# Patient Record
Sex: FEMALE | Race: WHITE | NOT HISPANIC OR LATINO | Employment: FULL TIME | URBAN - METROPOLITAN AREA
[De-identification: names, ages, dates, MRNs, and addresses within clinical notes are randomized per-mention and may not be internally consistent; named-entity substitution may affect disease eponyms.]

---

## 2019-10-17 ENCOUNTER — TRANSFERRED RECORDS (OUTPATIENT)
Dept: HEALTH INFORMATION MANAGEMENT | Facility: CLINIC | Age: 54
End: 2019-10-17

## 2019-11-04 ENCOUNTER — TRANSFERRED RECORDS (OUTPATIENT)
Dept: HEALTH INFORMATION MANAGEMENT | Facility: CLINIC | Age: 54
End: 2019-11-04

## 2020-03-06 ENCOUNTER — TRANSFERRED RECORDS (OUTPATIENT)
Dept: HEALTH INFORMATION MANAGEMENT | Facility: CLINIC | Age: 55
End: 2020-03-06

## 2020-03-12 ENCOUNTER — MEDICAL CORRESPONDENCE (OUTPATIENT)
Dept: HEALTH INFORMATION MANAGEMENT | Facility: CLINIC | Age: 55
End: 2020-03-12

## 2020-03-16 ENCOUNTER — TRANSCRIBE ORDERS (OUTPATIENT)
Dept: OTHER | Age: 55
End: 2020-03-16

## 2020-03-16 DIAGNOSIS — E05.00 GRAVES' ORBITOPATHY: Primary | ICD-10-CM

## 2020-03-19 ENCOUNTER — TELEPHONE (OUTPATIENT)
Dept: OPHTHALMOLOGY | Facility: CLINIC | Age: 55
End: 2020-03-19

## 2020-04-02 ENCOUNTER — TELEPHONE (OUTPATIENT)
Dept: OPHTHALMOLOGY | Facility: CLINIC | Age: 55
End: 2020-04-02

## 2020-04-02 NOTE — LETTER
2020         Lucrecia Thornton  [address] [facility] [address]      Patient: Lucrecia Thornton  : 1965   MRN: [id number]       Dear Ms. Thornton:    We attempted to reach you by telephone regarding your appointment on 2020 with Dr. Bhat.  Unfortunately, your appointment has been canceled at this time as we have been asked to cancel all clinics due to concern for COVID-19.  Please contact our appointment line at [phone] to reschedule in approximately two to three months.        Please feel free to contact our office with any questions or concerns.      Sincerely,         Srinivasan Bhat MD   Department of Ophthalmology   [facility]

## 2020-04-02 NOTE — TELEPHONE ENCOUNTER
Left message for patient.  Upcoming appointment with Dr. Bhat has been canceled due to concerns of COVID-19 and the safety of our patients.  Patient does have the option to virtual visit or can reschedule in clinic appointment to AT LEAST TWO TO THREE MONTHS OUT.  Gave her my direct number in order to reschedule.      Jaye Hernandes on 4/2/2020 at 3:33 PM

## 2020-04-14 ENCOUNTER — VIRTUAL VISIT (OUTPATIENT)
Dept: OPHTHALMOLOGY | Facility: CLINIC | Age: 55
End: 2020-04-14
Attending: OPHTHALMOLOGY
Payer: COMMERCIAL

## 2020-04-14 DIAGNOSIS — E07.9 THYROID EYE DISEASE: Primary | ICD-10-CM

## 2020-04-14 DIAGNOSIS — E05.00 GRAVES DISEASE: ICD-10-CM

## 2020-04-14 DIAGNOSIS — H57.89 THYROID EYE DISEASE: Primary | ICD-10-CM

## 2020-04-14 RX ORDER — LEVOTHYROXINE SODIUM 125 UG/1
50 TABLET ORAL DAILY
COMMUNITY
Start: 2020-03-08

## 2020-04-14 NOTE — PROGRESS NOTES
"Lucrecia Thornton is a 54 year old female who is being evaluated via a billable video visit.      The patient has been notified of following:     \"This video visit will be conducted via a call between you and your physician/provider. We have found that certain health care needs can be provided without the need for an in-person physical exam.  This service lets us provide the care you need with a video conversation.  If a prescription is necessary we can send it directly to your pharmacy.  If lab work is needed we can place an order for that and you can then stop by our lab to have the test done at a later time.    Video visits are billed at different rates depending on your insurance coverage.  Please reach out to your insurance provider with any questions.    If during the course of the call the physician/provider feels a video visit is not appropriate, you will not be charged for this service.\"    Patient has given verbal consent for Video visit? Yes    How would you like to obtain your AVS? E-Mail (inform patient AVS not encrypted)    Patient would like the video invitation sent by: Text to cell phone: [phone]      Video Start Time: 8:45 AM    Additional provider notes: insert own note template here        Assessment & Plan     HPI: Patient is a 54 year old female diagnosed with Graves disease in March 2019. She notes that on August 4th, 2019 she first started having a yellow discharge and noted that her eyes felt \"tight\" and \"gummy.\" She was then seen by Dr. Goerge and started Lasix for renetta-ocular edema. She developed a foreign body sensation, and photophobia with puffy eyelids in September 2019 and this progressed until October 2019. She saw Dr. Bustos and was noted to have increased intraocular pressure at [facility] and was started on the prednisolone and took it for 2 months. She started oral prednisone in early November starting on 40 mg and was on that dose for one month. In December she " continued to have difficulty looking up and took 20 mg prednisone daily for 3 weeks and then stopped. Overall she feels that her renetta-ocular edema and pain have improved since being on prednisone.     Over the past month her daughter has noted proptosis of both eyes at night. She notes that she has been having binocular diplopia for several months that has been getting better. Diplopia noticeable in primary gaze and right gaze. She has had improvement in her renetta-ocular edema and noticed that she is having more drooping skin. Continues to have watering when she exercises, reads, or is outdoors. She uses artificial tears multiple times a day that improves foreign body sensation momentarily. Denies eyelid retraction    Non-smoker. LASIK surgery both eyes 1998    Referring physician: Dr. George    Thyroid history:  Diagnosed when? March 2019  ORDOÑEZ: June 2019  Thyroidectomy: NA    TSI (date): 1.12 3/2020     Previous results: 17.1 10/2019, 4.74 12/2019    Eye symptoms (since when):   Proptosis (better/worse/same since last visit): Initial   Diplopia(better/worse/same since last visit): Initial  Eyelid retraction(better/worse/same since last visit): Initial  Tearing(better/worse/same since last visit): Initial  Redness (better/worse/same since last visit): Initial  Pain ((better/worse/same since last visit): Initial  Pain to move the eyes (better/worse/same since last visit): Initial  Blurred vision: Yes, initial    Ocular history:   Orbital decompression (date, details): NA  Strabismus surgery (date, details): NA  Eyelid surgery (date, details): NA    Exam:   Adam (base):NA     Better/worse same: NA  Strabismus (better/worse/same): NA  Eyelid retraction (better/worse/same): NA    Lucrecia Thornton is a 54 year old female with the following diagnoses:   1. Thyroid eye disease    2. Graves disease       It is my impression that Lucrecia has thyroid eye disease at this time that is improving based upon symptoms and TSI. She  was worse before starting prednisone and is now much better suggesting that she is on trending towards eventual stabilization. We discussed starting IV steroids at this time and the lesser incidence of side effects compared to oral steroids. She had palpitations with HR in the 200's on 40 mg oral prednisone requiring metoprolol. Likely intravenous steroids would be too dangerous. She would like treatment.  Will start prior authorization for Tepezza.  [facility]-6 [facility] sooner as needed for worsening symptoms.          Attending Physician Attestation:  Complete documentation of historical and exam elements from today's encounter can be found in the full encounter summary report (not reduplicated in this progress note).  I personally obtained the chief complaint(s) and history of present illness.  I confirmed and edited as necessary the review of systems, past medical/surgical history, family history, social history, and examination findings as documented by others; and I examined the patient myself.  I personally reviewed the relevant tests, images, and reports as documented above.  I formulated and edited as necessary the assessment and plan and discussed the findings and management plan with the patient and family. - Srinivasan John MD  Ophthalmology, PGY-5  Neuro-Ophthalmology Fellow        Video-Visit Details    Type of service:  Video Visit    Video End Time (time video stopped): 9:30    Originating Location (pt. Location): Home    Distant Location (provider location):  EYE CLINIC     Mode of Communication:  Video Conference via [employer]

## 2020-04-14 NOTE — LETTER
"2020         RE:  :  MRN: Lucrecia Thornton  1965  [id number]     Dear Dr. George:     Thank you for asking me to see your very pleasant patient, Lucrecia Thornton, in neuro-ophthalmic consultation.  I would like to thank you for sending your records and I have summarized them in the history of present illness.My assessment and plan are below.  For further details, please see my attached clinic note.      Assessment & Plan   HPI: Patient is a 54 year old female diagnosed with Graves disease in 2019. She notes that on 2019 she first started having a yellow discharge and noted that her eyes felt \"tight\" and \"gummy.\" She was then seen by Dr. George and started Lasix for renetta-ocular edema. She developed a foreign body sensation, and photophobia with puffy eyelids in 2019 and this progressed until 2019. She saw Dr. Bustos and was noted to have increased intraocular pressure at [facility] and was started on the prednisolone and took it for 2 months. She started oral prednisone in early November starting on 40 mg and was on that dose for one month. In December she continued to have difficulty looking up and took 20 mg prednisone daily for 3 weeks and then stopped. Overall she feels that her renetta-ocular edema and pain have improved since being on prednisone.     Over the past month her daughter has noted proptosis of both eyes at night. She notes that she has been having binocular diplopia for several months that has been getting better. Diplopia noticeable in primary gaze and right gaze. She has had improvement in her renetta-ocular edema and noticed that she is having more drooping skin. Continues to have watering when she exercises, reads, or is outdoors. She uses artificial tears multiple times a day that improves foreign body sensation momentarily. Denies eyelid retraction    Non-smoker. LASIK surgery both eyes     Referring physician: Dr. George    Thyroid " history:  Diagnosed when? March 2019  ORDOÑEZ: June 2019  Thyroidectomy: NA    TSI (date): 1.12 3/2020     Previous results: 17.1 10/2019, 4.74 12/2019    Eye symptoms (since when):   Proptosis (better/worse/same since last visit): Initial   Diplopia(better/worse/same since last visit): Initial  Eyelid retraction(better/worse/same since last visit): Initial  Tearing(better/worse/same since last visit): Initial  Redness (better/worse/same since last visit): Initial  Pain ((better/worse/same since last visit): Initial  Pain to move the eyes (better/worse/same since last visit): Initial  Blurred vision: Yes, initial    Ocular history:   Orbital decompression (date, details): NA  Strabismus surgery (date, details): NA  Eyelid surgery (date, details): NA    Exam:   Adam (base):NA     Better/worse same: NA  Strabismus (better/worse/same): NA  Eyelid retraction (better/worse/same): NA    Lucrecia Thornton is a 54 year old female with the following diagnoses:   1. Thyroid eye disease    2. Graves disease       It is my impression that Lucrecia has thyroid eye disease at this time that is improving based upon symptoms and TSI. She was worse before starting prednisone and is now much better suggesting that she is on trending towards eventual stabilization. We discussed starting IV steroids at this time and the lesser incidence of side effects compared to oral steroids. She had palpitations with HR in the 200's on 40 mg oral prednisone requiring metoprolol. Likely intravenous steroids would be too dangerous. She would like treatment.  Will start prior authorization for Tepezza.  recommend [facility] up in [facility] 4-6 months sooner as needed for worsening symptoms.             Again, thank you for allowing me to participate in the care of your patient.      Sincerely,    Srinivasan Bhat MD  Professor  Ophthalmology Residency   Director of Neuro-Ophthalmology  [facility]  Chair  Departments of Ophthalmology, Neurology, and Neurosurgery  [facility] [address] [place]  [address]  T - [phone]  F - [phone]  ROSINA [email or web address]      CC: Girma George MD  [facility]  [address] [address]  VIA Facsimile: [phone]    DX = Thyroid eye disease, tepezza

## 2020-04-14 NOTE — NURSING NOTE
Chief Complaints and History of Present Illnesses   Patient presents with     Thyroid Disease     took radioactive iodine last june. had a flare-up in august. got worse. seen by Dr. George in October and was diagnosed with Graves. Tapering from 40 mg prednisone over a period of 6-7 weeks (started december 14, 2019). discontinued prednisolone drops as well.      Chief Complaint(s) and History of Present Illness(es)     Thyroid Disease     Comments: took radioactive iodine last june. had a flare-up in august. got worse. seen by Dr. George in October and was diagnosed with Graves. Tapering from 40 mg prednisone over a period of 6-7 weeks (started december 14, 2019). discontinued prednisolone drops as well.               Comments     Complaining of intermittent diplopia. In the evenings oblique. Unsure if it improves with monocular closure. Worse after a long day when eyes are tired and can feel the swelling. Patient still has redness and edema.

## 2020-04-20 ENCOUNTER — TELEPHONE (OUTPATIENT)
Dept: OPHTHALMOLOGY | Facility: CLINIC | Age: 55
End: 2020-04-20

## 2020-04-20 NOTE — TELEPHONE ENCOUNTER
Left message for patient.  Insurance denied authorization asking for if patient had decrease in [place] and what their clinical activity score is.  Dr. Bhat would like to see patient in clinic in order to obtain.  Gave patient my direct number in order to schedule.     Jaye Hernandes on 4/20/2020 at 11:46 AM

## 2020-04-29 ENCOUNTER — OFFICE VISIT (OUTPATIENT)
Dept: OPHTHALMOLOGY | Facility: CLINIC | Age: 55
End: 2020-04-29

## 2020-04-29 DIAGNOSIS — H57.89 THYROID EYE DISEASE: ICD-10-CM

## 2020-04-29 DIAGNOSIS — E07.9 THYROID EYE DISEASE: ICD-10-CM

## 2020-04-29 DIAGNOSIS — H53.10 SUBJECTIVE VISUAL DISTURBANCE: ICD-10-CM

## 2020-04-29 DIAGNOSIS — H57.89 THYROID EYE DISEASE: Primary | ICD-10-CM

## 2020-04-29 DIAGNOSIS — E07.9 THYROID EYE DISEASE: Primary | ICD-10-CM

## 2020-04-29 RX ORDER — HEPARIN SODIUM,PORCINE 10 UNIT/ML
5 VIAL (ML) INTRAVENOUS
Status: CANCELLED | OUTPATIENT
Start: 2020-05-06

## 2020-04-29 RX ORDER — ACETAMINOPHEN 325 MG/1
650 TABLET ORAL
Status: CANCELLED
Start: 2020-05-06

## 2020-04-29 RX ORDER — METHYLPREDNISOLONE SODIUM SUCCINATE 125 MG/2ML
125 INJECTION, POWDER, LYOPHILIZED, FOR SOLUTION INTRAMUSCULAR; INTRAVENOUS
Status: CANCELLED
Start: 2020-05-06

## 2020-04-29 RX ORDER — HEPARIN SODIUM (PORCINE) LOCK FLUSH IV SOLN 100 UNIT/ML 100 UNIT/ML
5 SOLUTION INTRAVENOUS
Status: CANCELLED | OUTPATIENT
Start: 2020-05-06

## 2020-04-29 RX ORDER — DIPHENHYDRAMINE HCL 25 MG
50 CAPSULE ORAL
Status: CANCELLED
Start: 2020-05-06

## 2020-04-29 ASSESSMENT — VISUAL ACUITY
METHOD: SNELLEN - LINEAR
OS_SC: 20/20
OD_SC: 20/20

## 2020-04-29 ASSESSMENT — EXTERNAL EXAM - LEFT EYE: OS_EXAM: EYELID EDEMA

## 2020-04-29 ASSESSMENT — TONOMETRY
OS_IOP_MMHG: 15
IOP_METHOD: ICARE
OD_IOP_MMHG: 15

## 2020-04-29 ASSESSMENT — CONF VISUAL FIELD
METHOD: COUNTING FINGERS
OS_NORMAL: 1
OD_NORMAL: 1

## 2020-04-29 ASSESSMENT — EXTERNAL EXAM - RIGHT EYE: OD_EXAM: EYELID EDEMA

## 2020-04-29 ASSESSMENT — SLIT LAMP EXAM - LIDS
COMMENTS: NORMAL
COMMENTS: NORMAL

## 2020-04-29 NOTE — NURSING NOTE
Chief Complaints and History of Present Illnesses   Patient presents with     Consult For     Thyroid eye Disease     Chief Complaint(s) and History of Present Illness(es)     Consult For     Laterality: both eyes    Onset: 6 months ago    Associated symptoms: burning, double vision and dryness.  Negative for eye pain    Pain scale: 0/10    Comments: Thyroid eye Disease              Comments     Patient states she has had diplopia since October, stable, comes and goes, mostly present in the evenings, goes away when closing an eye, feels it is vertical diplopia.  Has bulging sensation after working out, along with throbbing in eyes and HA, dryness and burning occ.     Krystal Wills COT April 29, 2020 9:02 AM                   wears glasses at home/Partially impaired: cannot see medication labels or newsprint, but can see obstacles in path, and the surrounding layout; can count fingers at arm's length

## 2020-04-29 NOTE — PROGRESS NOTES
Assessment & Plan     Lucrecia Thornton is a 54 year old female with the following diagnoses:   1. Thyroid eye disease       1. Spontaneous orbital pain.     YES   2. Gaze evoked orbital pain.     YES  3. Eyelid swelling due to active thyroid eye disease  YES  4. Eyelid erythema.       NO  5. Conjunctival redness due to active thyroid eye disease . YES  6. Chemosis.        YES   7. Inflammation of caruncle OR plica.    NO    Patients assessed after follow-up can be scored out of 10 by  including items 8-10.    8. Increase of > 2mm in proptosis.    YES / NO [619193]   9. Decrease in uniocular excursion in any direction of > 8 . YES / NO [233220]  10. Decrease of acuity equivalent to 1 Snellen line.  YES / NO [270526]     SHANI SCORE = 5    She has Thyroid eye disease with a SHANI score of 5.  Will proceed with tepezza prior authorization.           Attending Physician Attestation:  Complete documentation of historical and exam elements from today's encounter can be found in the full encounter summary report (not reduplicated in this progress note).  I personally obtained the chief complaint(s) and history of present illness.  I confirmed and edited as necessary the review of systems, past medical/surgical history, family history, social history, and examination findings as documented by others; and I examined the patient myself.  I personally reviewed the relevant tests, images, and reports as documented above.  I formulated and edited as necessary the assessment and plan and discussed the findings and management plan with the patient and family. - Srinivasan Bhat MD;

## 2020-05-08 ENCOUNTER — TELEPHONE (OUTPATIENT)
Dept: OPHTHALMOLOGY | Facility: CLINIC | Age: 55
End: 2020-05-08

## 2020-05-08 NOTE — TELEPHONE ENCOUNTER
Spoke to patient.  Received approval from her insurance for her Tepezza infusions.  Provided scheduling number.  Mailed last chart notes and assisted with getting set up for MyChart.      Jaye Hernandes on 5/8/2020 at 8:55 AM

## 2020-05-15 ENCOUNTER — INFUSION THERAPY VISIT (OUTPATIENT)
Dept: INFUSION THERAPY | Facility: CLINIC | Age: 55
End: 2020-05-15
Attending: OPHTHALMOLOGY
Payer: COMMERCIAL

## 2020-05-15 VITALS — WEIGHT: 131.9 LBS | TEMPERATURE: 98.2 F

## 2020-05-15 DIAGNOSIS — H57.89 THYROID EYE DISEASE: Primary | ICD-10-CM

## 2020-05-15 DIAGNOSIS — E07.9 THYROID EYE DISEASE: Primary | ICD-10-CM

## 2020-05-15 PROCEDURE — 96366 THER/PROPH/DIAG IV INF ADDON: CPT

## 2020-05-15 PROCEDURE — 96365 THER/PROPH/DIAG IV INF INIT: CPT

## 2020-05-15 PROCEDURE — C9399 UNCLASSIFIED DRUGS OR BIOLOG: HCPCS | Mod: ZF | Performed by: OPHTHALMOLOGY

## 2020-05-15 PROCEDURE — 25000128 H RX IP 250 OP 636: Mod: ZF | Performed by: OPHTHALMOLOGY

## 2020-05-15 PROCEDURE — 25800030 ZZH RX IP 258 OP 636: Mod: ZF | Performed by: OPHTHALMOLOGY

## 2020-05-15 PROCEDURE — 96413 CHEMO IV INFUSION 1 HR: CPT

## 2020-05-15 PROCEDURE — 96415 CHEMO IV INFUSION ADDL HR: CPT

## 2020-05-15 RX ORDER — ACETAMINOPHEN 325 MG/1
650 TABLET ORAL
Status: CANCELLED
Start: 2020-06-05

## 2020-05-15 RX ORDER — DIPHENHYDRAMINE HCL 25 MG
50 CAPSULE ORAL
Status: CANCELLED
Start: 2020-06-05

## 2020-05-15 RX ORDER — HEPARIN SODIUM,PORCINE 10 UNIT/ML
5 VIAL (ML) INTRAVENOUS
Status: CANCELLED | OUTPATIENT
Start: 2020-06-05

## 2020-05-15 RX ORDER — HEPARIN SODIUM (PORCINE) LOCK FLUSH IV SOLN 100 UNIT/ML 100 UNIT/ML
5 SOLUTION INTRAVENOUS
Status: CANCELLED | OUTPATIENT
Start: 2020-06-05

## 2020-05-15 RX ORDER — METHYLPREDNISOLONE SODIUM SUCCINATE 125 MG/2ML
125 INJECTION, POWDER, LYOPHILIZED, FOR SOLUTION INTRAMUSCULAR; INTRAVENOUS
Status: CANCELLED
Start: 2020-06-05

## 2020-05-15 RX ADMIN — TEPROTUMUMAB 600 MG: 500 INJECTION, POWDER, LYOPHILIZED, FOR SOLUTION INTRAVENOUS at 13:21

## 2020-05-15 NOTE — PATIENT INSTRUCTIONS
Dear Lucrecia Thornton    Thank you for choosing [facility] ([facility]) for your infusion.  The following information is a summary of our appointment as well as important reminders.      Your infusion today of teprotumumab-trbw (TEPEZZA) 600 mg in sodium chloride 0.9 % 100 mL infusion       We look forward in seeing you on your next appointment here at [facility] ([facility]).  Please don t hesitate to call us at [phone] to reschedule any of your appointments or to speak with one of the [facility] registered nurses.  It was a pleasure taking care of you today.    Sincerely,    [facility]  [address] [place] [address]  [address]  Phone:  [phone]    Tepezza (teprotumumab-trbw) is an insulin-like growth factor-1 receptor inhibitor used to treat Thyroid Eye disease.    What Is Tepezza?  Tepezza (teprotumumab-trbw) is an insulin-like growth factor-1 receptor inhibitor used to treat Thyroid Eye disease.    What Are Side Effects of Tepezza?  Side effects of Tepezza include:    muscle spasm,  nausea,  hair loss,  diarrhea,  fatigue,  high blood sugar (hyperglycemia),  hearing impairment,  dry skin,  changes in taste, and  headache  Dosage for Tepezza  The initial dose of Tepezza is 10 mg/kg for the first infusion, followed by 20 mg/kg every 3 weeks for 7 additional infusions. Tepezza is administered by intravenous infusion over 60 to 90 minutes.    Tepezza In Children  The safety and effectiveness of Tepezza have not been established in pediatric patients.    What Drugs, Substances, or Supplements Interact with Tepezza?  Tepezza may interact with other medicines.    Tell your doctor all medications and supplements you use.    Tepezza During Pregnancy and Breastfeeding  Tepezza is not recommended for use during pregnancy; it may harm a fetus. Appropriate forms of  contraception should be implemented prior to initiation, during treatment, and for 6 months following the last dose of Tepezza. It is unknown if Tepezza passes into breast milk or how it would affect a nursing infant. Consult your doctor before breastfeeding.    Additional Information  Our Tepezza (teprotumumab-trbw) for Injection, for Intravenous Use Side Effects Drug Center provides a comprehensive view of available drug information on the potential side effects when taking this medication.    This is not a complete list of side effects and others may occur. Call your doctor for medical advice about side effects. You may report side effects to FDA at 1-273-FDA-1082.    This is not a complete list of side effects and others may occur. Call your doctor for medical advice about side effects. You may report side effects to FDA at 1-670-FDA-2969    https://www.rxlist.com/tepezza-side-effects-drug-center.htm

## 2020-05-15 NOTE — PROGRESS NOTES
Nursing Note  Lucrecia Thornton [facility]:   Chief Complaint   Patient presents with     Infusion     Teprotumumab-trbw (tepezza)     During today's [facility] appointment, orders from Srinivasan Bhat MD were completed.  Frequency: every 21 days    Progress note:  Patient identification verified by name and date of birth.  Assessment completed.  Vitals recorded in Doc Flowsheets.  Patient was provided with education regarding medication/procedure and possible side effects.  Patient verbalized understanding.     present during visit today: Not Applicable.    Treatment Conditions: Patient denies fever, chills, signs of infection, recent illness, antibiotics use, productive cough or elevated temperature.    Premedications: historically patient has not taken pre-medications prior to infusion.  Drug Waste Record: No  Infusion length and rate:  66.7 ml/hr., over 90 minues  Labs: were not ordered for this appointment.  Vascular access: peripheral IV placed today.    Post Infusion Assessment:  Patient tolerated infusion without incident.     Discharge Plan:   Follow up plan of care with: ongoing infusions at [facility].  Discharge instructions were reviewed with patient.  Patient/representative verbalized understanding of discharge instructions and all questions answered.  Patient discharged from [facility] in stable condition.    Lucrecia Carter RN    Administrations This Visit     teprotumumab-trbw (TEPEZZA) 600 mg in sodium chloride 0.9 % 100 mL infusion     Admin Date  05/15/2020 Action  New Bag Dose  600 mg Rate  66.7 mL/hr Route  Intravenous Administered By  Gertrude Winkler RN                Temp 98.2  F (36.8  C) (Oral)   Wt 59.8 kg (131 lb 14.4 oz)

## 2020-06-04 ENCOUNTER — INFUSION THERAPY VISIT (OUTPATIENT)
Dept: INFUSION THERAPY | Facility: CLINIC | Age: 55
End: 2020-06-04
Attending: OPHTHALMOLOGY
Payer: COMMERCIAL

## 2020-06-04 VITALS
RESPIRATION RATE: 16 BRPM | OXYGEN SATURATION: 98 % | WEIGHT: 130.1 LBS | SYSTOLIC BLOOD PRESSURE: 126 MMHG | DIASTOLIC BLOOD PRESSURE: 73 MMHG | TEMPERATURE: 98 F | HEART RATE: 54 BPM

## 2020-06-04 DIAGNOSIS — H57.89 THYROID EYE DISEASE: Primary | ICD-10-CM

## 2020-06-04 DIAGNOSIS — E07.9 THYROID EYE DISEASE: Primary | ICD-10-CM

## 2020-06-04 PROCEDURE — 96415 CHEMO IV INFUSION ADDL HR: CPT

## 2020-06-04 PROCEDURE — C9399 UNCLASSIFIED DRUGS OR BIOLOG: HCPCS | Mod: ZF | Performed by: OPHTHALMOLOGY

## 2020-06-04 PROCEDURE — 25000128 H RX IP 250 OP 636: Mod: ZF | Performed by: OPHTHALMOLOGY

## 2020-06-04 PROCEDURE — 25800030 ZZH RX IP 258 OP 636: Mod: ZF | Performed by: OPHTHALMOLOGY

## 2020-06-04 PROCEDURE — 96413 CHEMO IV INFUSION 1 HR: CPT

## 2020-06-04 RX ORDER — HEPARIN SODIUM (PORCINE) LOCK FLUSH IV SOLN 100 UNIT/ML 100 UNIT/ML
5 SOLUTION INTRAVENOUS
Status: CANCELLED | OUTPATIENT
Start: 2020-06-05

## 2020-06-04 RX ORDER — ACETAMINOPHEN 325 MG/1
650 TABLET ORAL
Status: CANCELLED
Start: 2020-06-05

## 2020-06-04 RX ORDER — DIPHENHYDRAMINE HCL 25 MG
50 CAPSULE ORAL
Status: CANCELLED
Start: 2020-06-05

## 2020-06-04 RX ORDER — METHYLPREDNISOLONE SODIUM SUCCINATE 125 MG/2ML
125 INJECTION, POWDER, LYOPHILIZED, FOR SOLUTION INTRAMUSCULAR; INTRAVENOUS
Status: CANCELLED
Start: 2020-06-05

## 2020-06-04 RX ORDER — HEPARIN SODIUM,PORCINE 10 UNIT/ML
5 VIAL (ML) INTRAVENOUS
Status: CANCELLED | OUTPATIENT
Start: 2020-06-05

## 2020-06-04 RX ADMIN — TEPROTUMUMAB 1180 MG: 500 INJECTION, POWDER, LYOPHILIZED, FOR SOLUTION INTRAVENOUS at 12:05

## 2020-06-04 NOTE — PROGRESS NOTES
Nursing Note  Lucrecia Thornton [facility] for:   Chief Complaint   Patient presents with     Infusion     IV Tepezza     During today's [facility] appointment, orders from Srinivasan Bhat MD were completed.  Frequency: every 21 days, dose #2    Progress note:  Patient identification verified by name and date of birth.  Assessment completed.  Vitals recorded in Doc Flowsheets.  Patient was provided with education regarding medication/procedure and possible side effects.  Patient verbalized understanding.     present during visit today: Not Applicable.    Treatment Conditions: Patient denies fever, chills, signs of infection, recent illness, antibiotics use, productive cough or elevated temperature.    Premedications: were not ordered, tolerated first infusion well without premeds.  Drug Waste Record: No  Infusion length and rate:  66.7 ml/hr., over 90 minues  Labs: were not ordered for this appointment.  Vascular access: peripheral IV placed today.    Post Infusion Assessment:  Patient tolerated infusion without incident.  Blood return noted pre and post infusion.  Site patent and intact, free from redness, edema or discomfort.  No evidence of extravasations.  Access discontinued per protocol.     Discharge Plan:   AVS given to patient.   Follow up plan of care with: ongoing infusions at [facility].  Discharge instructions were reviewed with patient.  Patient/representative verbalized understanding of discharge instructions and all questions answered.  Patient discharged [facility] in stable condition.    Mayito Lutz RN     Administrations This Visit     teprotumumab-trbw (TEPEZZA) 1,180 mg in sodium chloride 0.9 % 100 mL infusion     Admin Date  06/04/2020 Action  New Bag Dose  1180 mg Rate  66.7 mL/hr Route  Intravenous Administered By  Mayito Lutz,  RN              Vital signs:  Temp: 98  F (36.7  C) Temp src: Oral BP: 124/74 Pulse: 56   Resp: 16 SpO2: 98 % O2 Device: None (Room air)     Weight: 59 kg (130 lb 1.6 oz)  There is no height or weight on file to calculate BMI.

## 2020-06-04 NOTE — PATIENT INSTRUCTIONS
Dear Lucrecia Thornton    Thank you for choosing [facility] ([facility]) for your infusion.  The following information is a summary of our appointment as well as important reminders.      Infusion reactions have been reported in approximately 4% of patients treated with TEPEZZA. Infusion reactions may occur during any of the infusions or within 1.5 hours after an infusion. Reported infusion reactions are usually mild or moderate in severity and can usually be successfully managed with corticosteroids and antihistamines.    Should not be used in pregnancy, and appropriate forms of contraception should be implemented prior to initiation, during treatment and for 6 months following the last dose.     Infusion reactions could occur up to 1.5 hours post infusion. Contact your HCP or seek urgent care if occurs.    We look forward in seeing you on your next appointment here at [facility] ([facility]).  Please don t hesitate to call us at [phone] to reschedule any of your appointments or to speak with one of the [facility] registered nurses.  It was a pleasure taking care of you today.    Sincerely,    [facility]  [facility]  [address] [place] [address]  [address]  Phone:  [phone]

## 2020-06-04 NOTE — LETTER
6/4/2020         RE: Lucrecia Thornton  [address]  [place] [address]        Dear Colleague,    Thank you for referring your patient, Lucrecia Thornton, to the [facility] TREATMENT [place] SPECIALTY AND PROCEDURE. Please see a copy of my visit note below.    Nursing Note  Lucrecia Thornton [facility] for:   Chief Complaint   Patient presents with     Infusion     IV Tepezza     During [facility]'s [facility] appointment, orders from Srinivasan Bhat MD were completed.  Frequency: every 21 days, dose #2    Progress note:  Patient identification verified by name and date of birth.  Assessment completed.  Vitals recorded in Doc Flowsheets.  Patient was provided with education regarding medication/procedure and possible side effects.  Patient verbalized understanding.     present during visit today: Not Applicable.    Treatment Conditions: Patient denies fever, chills, signs of infection, recent illness, antibiotics use, productive cough or elevated temperature.    Premedications: were not ordered, tolerated first infusion well without premeds.  Drug Waste Record: No  Infusion length and rate:  66.7 ml/hr., over 90 minues  Labs: were not ordered for this appointment.  Vascular access: peripheral IV placed today.    Post Infusion Assessment:  Patient tolerated infusion without incident.  Blood return noted pre and post infusion.  Site patent and intact, free from redness, edema or discomfort.  No evidence of extravasations.  Access discontinued per protocol.     Discharge Plan:   AVS given to patient.   Follow up plan of care with: ongoing inf[facility].  Discharge instructions were reviewed with patient.  Patient/representative verbalized understanding of discharge instructions and all questions answered.  Patient discharged [facility] in  stable condition.    Mayito Lutz RN     Administrations This Visit     teprotumumab-trbw (TEPEZZA) 1,180 mg in sodium chloride 0.9 % 100 mL infusion     Admin Date  06/04/2020 Action  New Bag Dose  1180 mg Rate  66.7 mL/hr Route  Intravenous Administered By  Mayito Lutz, SUMMER              Vital signs:  Temp: 98  F (36.7  C) Temp src: Oral BP: 124/74 Pulse: 56   Resp: 16 SpO2: 98 % O2 Device: None (Room air)     Weight: 59 kg (130 lb 1.6 oz)  There is no height or weight on file to calculate BMI.                      Again, thank you for allowing me to participate in the care of your patient.        Sincerely,        [facility]

## 2020-06-26 ENCOUNTER — INFUSION THERAPY VISIT (OUTPATIENT)
Dept: INFUSION THERAPY | Facility: CLINIC | Age: 55
End: 2020-06-26
Attending: OPHTHALMOLOGY
Payer: COMMERCIAL

## 2020-06-26 VITALS
WEIGHT: 128 LBS | RESPIRATION RATE: 16 BRPM | TEMPERATURE: 98.3 F | HEART RATE: 54 BPM | DIASTOLIC BLOOD PRESSURE: 77 MMHG | OXYGEN SATURATION: 96 % | SYSTOLIC BLOOD PRESSURE: 120 MMHG

## 2020-06-26 DIAGNOSIS — H57.89 THYROID EYE DISEASE: Primary | ICD-10-CM

## 2020-06-26 DIAGNOSIS — E07.9 THYROID EYE DISEASE: Primary | ICD-10-CM

## 2020-06-26 PROCEDURE — 25000128 H RX IP 250 OP 636: Mod: ZF | Performed by: OPHTHALMOLOGY

## 2020-06-26 PROCEDURE — 96413 CHEMO IV INFUSION 1 HR: CPT

## 2020-06-26 PROCEDURE — 25800030 ZZH RX IP 258 OP 636: Mod: ZF | Performed by: OPHTHALMOLOGY

## 2020-06-26 PROCEDURE — C9399 UNCLASSIFIED DRUGS OR BIOLOG: HCPCS | Mod: ZF | Performed by: OPHTHALMOLOGY

## 2020-06-26 RX ORDER — METHYLPREDNISOLONE SODIUM SUCCINATE 125 MG/2ML
125 INJECTION, POWDER, LYOPHILIZED, FOR SOLUTION INTRAMUSCULAR; INTRAVENOUS
Status: CANCELLED
Start: 2020-07-17

## 2020-06-26 RX ORDER — DIPHENHYDRAMINE HCL 25 MG
50 CAPSULE ORAL
Status: DISCONTINUED | OUTPATIENT
Start: 2020-06-26 | End: 2020-06-26 | Stop reason: CLARIF

## 2020-06-26 RX ORDER — DIPHENHYDRAMINE HCL 25 MG
50 CAPSULE ORAL
Status: CANCELLED
Start: 2020-07-17

## 2020-06-26 RX ORDER — METHYLPREDNISOLONE SODIUM SUCCINATE 125 MG/2ML
125 INJECTION, POWDER, LYOPHILIZED, FOR SOLUTION INTRAMUSCULAR; INTRAVENOUS
Status: DISCONTINUED | OUTPATIENT
Start: 2020-06-26 | End: 2020-06-26 | Stop reason: CLARIF

## 2020-06-26 RX ORDER — ACETAMINOPHEN 325 MG/1
650 TABLET ORAL
Status: DISCONTINUED | OUTPATIENT
Start: 2020-06-26 | End: 2020-06-26 | Stop reason: CLARIF

## 2020-06-26 RX ORDER — HEPARIN SODIUM (PORCINE) LOCK FLUSH IV SOLN 100 UNIT/ML 100 UNIT/ML
5 SOLUTION INTRAVENOUS
Status: CANCELLED | OUTPATIENT
Start: 2020-07-17

## 2020-06-26 RX ORDER — ACETAMINOPHEN 325 MG/1
650 TABLET ORAL
Status: CANCELLED
Start: 2020-07-17

## 2020-06-26 RX ORDER — HEPARIN SODIUM,PORCINE 10 UNIT/ML
5 VIAL (ML) INTRAVENOUS
Status: CANCELLED | OUTPATIENT
Start: 2020-07-17

## 2020-06-26 RX ADMIN — TEPROTUMUMAB 1160 MG: 500 INJECTION, POWDER, LYOPHILIZED, FOR SOLUTION INTRAVENOUS at 11:27

## 2020-06-26 NOTE — LETTER
6/26/2020         RE: Lucrecia Thornton  [address]  [place] [address]        Dear Colleague,    Thank you for referring your patient, Lucrecia Thornton, to the [facility] SPECIAL[facility] AND PROCEDURE. Please see a copy of my visit note below.    Nursing Note  Lucrecia Thornton [facility] for:   Chief Complaint   Patient presents with     Infusion     Tepezza     During [facility]'s [facility] appointment, orders from Dr Bhat were completed.  Frequency: every 21 days    Progress note:  Patient identification verified by name and date of birth.  Assessment completed.  Vitals recorded in Doc Flowsheets.  Patient was provided with education regarding medication/procedure and possible side effects.  Patient verbalized understanding.     present during visit today: Not Applicable.    Treatment Conditions: ~~~ NOTE: If the patient answers yes to any of the questions below, hold the infusion and contact ordering provider or on-call provider.    1. Have you recently had an elevated temperature, fever, chills, productive cough, coughing for 3 weeks or longer or hemoptysis, abnormal vital signs, night sweats,  chest pain or have you noticed a decrease in your appetite, unexplained weight loss or fatigue? No  2. Do you have any open wounds or new incisions? No  3. Do you have any recent or upcoming hospitalizations, surgeries or dental procedures? No  4. Do you currently have or recently have had any signs of illness or infection or are you on any antibiotics? No  5. Have you had any new, sudden or worsening abdominal pain? No  6. Have you or anyone in your household received a live vaccination in the past 4 weeks? Please note:  No live vaccines while on biologic/chemotherapy until 6 months after the last treatment.  Patient can receive the flu vaccine (shot only) and the pneumovax.  It is optimal for the  patient to get these vaccines mid cycle, but they can be given at any time as long as it is not on the day of the infusion. No  7. Have you recently been diagnosed with any new nervous system diseases (ie. Multiple sclerosis, Guillain [place], seizures, neurological changes) or cancer diagnosis? No  8. Are you on any form of radiation or chemotherapy? No  9. Are you pregnant or breast feeding or do you have plans of pregnancy in the future? No  10. Have you been having any signs of worsening depression or suicidal ideations?  (benlysta only) No  11. Have there been any other new onset medical symptoms? No      Premedications: historically patient has not taken pre-medications prior to infusion.    Drug Waste Record: No    Infusion length and rate:  infusion given over approximately 60 minutes    Labs: were not ordered for this appointment.    Vascular access: peripheral IV placed today.    Post Infusion Assessment:  Patient tolerated infusion without incident.     Discharge Plan:   Follow up plan of care with: ongoing infusions [facility].  Discharge instructions were reviewed with patient.  Patient/representative verbalized understanding of discharge instructions and all questions answered.  Patient discharged [facility] in stable condition.    MARTHA PERDOMO RN    Administrations This Visit     teprotumumab-trbw (TEPEZZA) 1,160 mg in sodium chloride 0.9 % 100 mL infusion     Admin Date  06/26/2020 Action  New Bag Dose  1160 mg Rate  100 mL/hr Route  Intravenous Administered By  Martha Perdomo RN                /79   Pulse 74   Temp 98.3  F (36.8  C)   Resp 16   Wt 58.1 kg (128 lb)   SpO2 96%         Again, thank you for allowing me to participate in the care of your patient.        Sincerely,        [facility]

## 2020-06-26 NOTE — PROGRESS NOTES
Nursing Note  Lucrecia Thornton [facility] for:   Chief Complaint   Patient presents with     Infusion     Tepezza     During [facility]'s [facility] appointment, orders from Dr Bhat were completed.  Frequency: every 21 days    Progress note:  Patient identification verified by name and date of birth.  Assessment completed.  Vitals recorded in Doc Flowsheets.  Patient was provided with education regarding medication/procedure and possible side effects.  Patient verbalized understanding.     present during visit today: Not Applicable.    Treatment Conditions: ~~~ NOTE: If the patient answers yes to any of the questions below, hold the infusion and contact ordering provider or on-call provider.    1. Have you recently had an elevated temperature, fever, chills, productive cough, coughing for 3 weeks or longer or hemoptysis, abnormal vital signs, night sweats,  chest pain or have you noticed a decrease in your appetite, unexplained weight loss or fatigue? No  2. Do you have any open wounds or new incisions? No  3. Do you have any recent or upcoming hospitalizations, surgeries or dental procedures? No  4. Do you currently have or recently have had any signs of illness or infection or are you on any antibiotics? No  5. Have you had any new, sudden or worsening abdominal pain? No  6. Have you or anyone in your household received a live vaccination in the past 4 weeks? Please note:  No live vaccines while on biologic/chemotherapy until 6 months after the last treatment.  Patient can receive the flu vaccine (shot only) and the pneumovax.  It is optimal for the patient to get these vaccines mid cycle, but they can be given at any time as long as it is not on the day of the infusion. No  7. Have you recently been diagnosed with any new nervous system diseases (ie. Multiple sclerosis, Guillain [place], seizures, neurological changes) or cancer  diagnosis? No  8. Are you on any form of radiation or chemotherapy? No  9. Are you pregnant or breast feeding or do you have plans of pregnancy in the future? No  10. Have you been having any signs of worsening depression or suicidal ideations?  (benlysta only) No  11. Have there been any other new onset medical symptoms? No      Premedications: historically patient has not taken pre-medications prior to infusion.    Drug Waste Record: No    Infusion length and rate:  infusion given over approximately 60 minutes    Labs: were not ordered for this appointment.    Vascular access: peripheral IV placed today.    Post Infusion Assessment:  Patient tolerated infusion without incident.     Discharge Plan:   Follow up plan of care with: ongoing infusions [facility].  Discharge instructions were reviewed with patient.  Patient/representative verbalized understanding of discharge instructions and all questions answered.  Patient discharged [facility] in stable condition.    MARTHA PERDOMO, SUMMER    Administrations This Visit     teprotumumab-trbw (TEPEZZA) 1,160 mg in sodium chloride 0.9 % 100 mL infusion     Admin Date  06/26/2020 Action  New Bag Dose  1160 mg Rate  100 mL/hr Route  Intravenous Administered By  Martha Perdomo RN                /79   Pulse 74   Temp 98.3  F (36.8  C)   Resp 16   Wt 58.1 kg (128 lb)   SpO2 96%

## 2020-07-16 ENCOUNTER — INFUSION THERAPY VISIT (OUTPATIENT)
Dept: INFUSION THERAPY | Facility: CLINIC | Age: 55
End: 2020-07-16
Attending: OPHTHALMOLOGY
Payer: COMMERCIAL

## 2020-07-16 VITALS
RESPIRATION RATE: 16 BRPM | HEART RATE: 58 BPM | WEIGHT: 126.4 LBS | DIASTOLIC BLOOD PRESSURE: 77 MMHG | SYSTOLIC BLOOD PRESSURE: 123 MMHG | TEMPERATURE: 97.8 F | OXYGEN SATURATION: 100 %

## 2020-07-16 DIAGNOSIS — H57.89 THYROID EYE DISEASE: Primary | ICD-10-CM

## 2020-07-16 DIAGNOSIS — E07.9 THYROID EYE DISEASE: Primary | ICD-10-CM

## 2020-07-16 PROCEDURE — 96365 THER/PROPH/DIAG IV INF INIT: CPT

## 2020-07-16 PROCEDURE — 25000128 H RX IP 250 OP 636: Mod: ZF | Performed by: OPHTHALMOLOGY

## 2020-07-16 PROCEDURE — 25800030 ZZH RX IP 258 OP 636: Mod: ZF | Performed by: OPHTHALMOLOGY

## 2020-07-16 PROCEDURE — C9061 INJECTION, TEPROTUMUMAB-TRBW: HCPCS | Mod: ZF | Performed by: OPHTHALMOLOGY

## 2020-07-16 RX ORDER — METHYLPREDNISOLONE SODIUM SUCCINATE 125 MG/2ML
125 INJECTION, POWDER, LYOPHILIZED, FOR SOLUTION INTRAMUSCULAR; INTRAVENOUS
Status: CANCELLED
Start: 2020-07-17

## 2020-07-16 RX ORDER — DIPHENHYDRAMINE HCL 25 MG
50 CAPSULE ORAL
Status: CANCELLED
Start: 2020-07-17

## 2020-07-16 RX ORDER — ACETAMINOPHEN 325 MG/1
650 TABLET ORAL
Status: CANCELLED
Start: 2020-07-17

## 2020-07-16 RX ORDER — HEPARIN SODIUM,PORCINE 10 UNIT/ML
5 VIAL (ML) INTRAVENOUS
Status: CANCELLED | OUTPATIENT
Start: 2020-07-17

## 2020-07-16 RX ORDER — HEPARIN SODIUM (PORCINE) LOCK FLUSH IV SOLN 100 UNIT/ML 100 UNIT/ML
5 SOLUTION INTRAVENOUS
Status: CANCELLED | OUTPATIENT
Start: 2020-07-17

## 2020-07-16 RX ADMIN — TEPROTUMUMAB 1150 MG: 500 INJECTION, POWDER, LYOPHILIZED, FOR SOLUTION INTRAVENOUS at 09:36

## 2020-07-16 NOTE — LETTER
7/16/2020         RE: Lucrecia Thornton  [address]  [place] [address]        Dear Colleague,    Thank you for referring your patient, Lucrecia Thornton, to the [facility] TREATMENT [place] SPECIALTY AND PROCEDURE. Please see a copy of my visit note below.    Nursing Note  Lucrecia Thornton presents [facility] for:   Chief Complaint   Patient presents with     Infusion     Tepezza     During [facility]'s [facility] appointment, orders from Dr. Bhat were completed.  Frequency: every 3 weeks    Progress note:  Patient identification verified by name and date of birth.  Assessment completed.  Vitals recorded in Doc Flowsheets.  Patient was provided with education regarding medication/procedure and possible side effects.  Patient verbalized understanding.    Infusion length and rate:  infusion given over approximately 60 minutes  100 ml/hr.    Labs: were not ordered for this appointment.    Vascular access: peripheral IV placed today.    Post Infusion Assessment:  Patient tolerated infusion without incident.     Discharge Plan:   Follow up plan of care with: ongoing infusions at [facility]. and primary care provider,  Discharge instructions were reviewed with patient.  Patient/representative verbalized understanding of discharge instructions and all questions answered.  Patient discharged [facility] in stable condition.    Ne Pedraza RN       Administrations This Visit     teprotumumab-trbw (TEPEZZA) 1,150 mg in sodium chloride 0.9 % 100 mL infusion     Admin Date  07/16/2020 Action  New Bag Dose  1150 mg Rate  100 mL/hr Route  Intravenous Administered By  Ne Pedraza, RN                  /85   Pulse 63   Temp 97.8  F (36.6  C) (Oral)   Resp 16   Wt 57.3 kg (126 lb 6.4 oz)   SpO2 100%       Again, thank you for allowing me to participate in the care  of your patient.        Sincerely,        [facility]

## 2020-07-16 NOTE — PROGRESS NOTES
Nursing Note  Lucrecia Thornton presents [facility] for:   Chief Complaint   Patient presents with     Infusion     Tepezza     During [facility]'s [facility] appointment, orders from Dr. Bhat were completed.  Frequency: every 3 weeks    Progress note:  Patient identification verified by name and date of birth.  Assessment completed.  Vitals recorded in Doc Flowsheets.  Patient was provided with education regarding medication/procedure and possible side effects.  Patient verbalized understanding.    Infusion length and rate:  infusion given over approximately 60 minutes  100 ml/hr.    Labs: were not ordered for this appointment.    Vascular access: peripheral IV placed today.    Post Infusion Assessment:  Patient tolerated infusion without incident.     Discharge Plan:   Follow up plan of care with: ongoing infusions at [facility]. and primary care provider,  Discharge instructions were reviewed with patient.  Patient/representative verbalized understanding of discharge instructions and all questions answered.  Patient discharged [facility] in stable condition.    Ne Pedraza RN       Administrations This Visit     teprotumumab-trbw (TEPEZZA) 1,150 mg in sodium chloride 0.9 % 100 mL infusion     Admin Date  07/16/2020 Action  New Bag Dose  1150 mg Rate  100 mL/hr Route  Intravenous Administered By  eN Pedraza, SUMMER                  /85   Pulse 63   Temp 97.8  F (36.6  C) (Oral)   Resp 16   Wt 57.3 kg (126 lb 6.4 oz)   SpO2 100%

## 2020-07-17 ENCOUNTER — TELEPHONE (OUTPATIENT)
Dept: OPHTHALMOLOGY | Facility: CLINIC | Age: 55
End: 2020-07-17

## 2020-07-17 NOTE — TELEPHONE ENCOUNTER
Spoke to patient who confirmed the appointment for Monday, 07/20/2020.They were advised of the changes due to Covid-19 (Visitor Restrictions, screening, etc.)     -Eliza Cabral

## 2020-07-20 ENCOUNTER — OFFICE VISIT (OUTPATIENT)
Dept: OPHTHALMOLOGY | Facility: CLINIC | Age: 55
End: 2020-07-20
Attending: OPHTHALMOLOGY
Payer: COMMERCIAL

## 2020-07-20 DIAGNOSIS — E07.9 THYROID EYE DISEASE: Primary | ICD-10-CM

## 2020-07-20 DIAGNOSIS — H57.89 THYROID EYE DISEASE: Primary | ICD-10-CM

## 2020-07-20 PROCEDURE — G0463 HOSPITAL OUTPT CLINIC VISIT: HCPCS | Mod: ZF

## 2020-07-20 ASSESSMENT — EXTERNAL EXAM - LEFT EYE: OS_EXAM: NORMAL

## 2020-07-20 ASSESSMENT — SLIT LAMP EXAM - LIDS
COMMENTS: NORMAL
COMMENTS: NORMAL

## 2020-07-20 ASSESSMENT — EXTERNAL EXAM - RIGHT EYE: OD_EXAM: NORMAL

## 2020-07-20 ASSESSMENT — TONOMETRY
IOP_METHOD: ICARE
OD_IOP_MMHG: 16
OS_IOP_MMHG: 14

## 2020-07-20 ASSESSMENT — VISUAL ACUITY
METHOD: SNELLEN - LINEAR
OD_SC: 20/20
OS_SC: 20/20

## 2020-07-20 NOTE — PROGRESS NOTES
Assessment & Plan     HPI: follow up Thyroid eye disease.  Patient has had 4 tepezza infusions and feels she is much better.  Her double vision is gone.        Eye symptoms (since when):   Proptosis (better/worse/same since last visit): s  Diplopia(better/worse/same since last visit): b  Eyelid retraction(better/worse/same since last visit): same  Tearing(better/worse/same since last visit): better  Redness (better/worse/same since last visit): better   Pain ((better/worse/same since last visit): better   Pain to move the eyes (better/worse/same since last visit): better   Blurred vision: none        Exam:   Adam (base): 95   Better/worse same: initial     Strabismus (better/worse/same): better  Eyelid retraction (better/worse/same): same      Lucrecia Thornton is a 54 year old female with the following diagnoses:   1. Thyroid eye disease       Follow up Thyroid eye disease status-post 4 tepezza injections.  SHANI score is now zero.  Follow up 6 months sooner as needed for worsening symptoms.          Attending Physician Attestation:  Complete documentation of historical and exam elements from today's encounter can be found in the full encounter summary report (not reduplicated in this progress note).  I personally obtained the chief complaint(s) and history of present illness.  I confirmed and edited as necessary the review of systems, past medical/surgical history, family history, social history, and examination findings as documented by others; and I examined the patient myself.  I personally reviewed the relevant tests, images, and reports as documented above.  I formulated and edited as necessary the assessment and plan and discussed the findings and management plan with the patient and family. - Srinivasan Bhat MD

## 2020-07-20 NOTE — NURSING NOTE
Chief Complaint(s) and History of Present Illness(es)     Thyroid Disease     Laterality: both eyes    Associated symptoms: dryness.  Negative for eye pain, redness and tearing              Comments     Has had 4 Tepezza infusions, Lucrecia notices great improvement os symptoms. Orbital pain is gone, no diplopia for 3 weeks. Still feels that eyes are dry, using AT.

## 2020-08-07 ENCOUNTER — INFUSION THERAPY VISIT (OUTPATIENT)
Dept: INFUSION THERAPY | Facility: CLINIC | Age: 55
End: 2020-08-07
Attending: OPHTHALMOLOGY
Payer: COMMERCIAL

## 2020-08-07 VITALS
DIASTOLIC BLOOD PRESSURE: 76 MMHG | RESPIRATION RATE: 16 BRPM | SYSTOLIC BLOOD PRESSURE: 123 MMHG | TEMPERATURE: 97.9 F | HEART RATE: 56 BPM

## 2020-08-07 DIAGNOSIS — E07.9 THYROID EYE DISEASE: Primary | ICD-10-CM

## 2020-08-07 DIAGNOSIS — H57.89 THYROID EYE DISEASE: Primary | ICD-10-CM

## 2020-08-07 PROCEDURE — C9061 INJECTION, TEPROTUMUMAB-TRBW: HCPCS | Mod: ZF | Performed by: OPHTHALMOLOGY

## 2020-08-07 PROCEDURE — 25800030 ZZH RX IP 258 OP 636: Mod: ZF | Performed by: OPHTHALMOLOGY

## 2020-08-07 PROCEDURE — 96365 THER/PROPH/DIAG IV INF INIT: CPT

## 2020-08-07 PROCEDURE — 25000128 H RX IP 250 OP 636: Mod: ZF | Performed by: OPHTHALMOLOGY

## 2020-08-07 RX ORDER — HEPARIN SODIUM,PORCINE 10 UNIT/ML
5 VIAL (ML) INTRAVENOUS
Status: DISCONTINUED | OUTPATIENT
Start: 2020-08-07 | End: 2020-08-07 | Stop reason: HOSPADM

## 2020-08-07 RX ORDER — ACETAMINOPHEN 325 MG/1
650 TABLET ORAL
Status: CANCELLED
Start: 2020-08-28

## 2020-08-07 RX ORDER — HEPARIN SODIUM (PORCINE) LOCK FLUSH IV SOLN 100 UNIT/ML 100 UNIT/ML
5 SOLUTION INTRAVENOUS
Status: DISCONTINUED | OUTPATIENT
Start: 2020-08-07 | End: 2020-08-07 | Stop reason: HOSPADM

## 2020-08-07 RX ORDER — METHYLPREDNISOLONE SODIUM SUCCINATE 125 MG/2ML
125 INJECTION, POWDER, LYOPHILIZED, FOR SOLUTION INTRAMUSCULAR; INTRAVENOUS
Status: CANCELLED
Start: 2020-08-28

## 2020-08-07 RX ORDER — HEPARIN SODIUM,PORCINE 10 UNIT/ML
5 VIAL (ML) INTRAVENOUS
Status: CANCELLED | OUTPATIENT
Start: 2020-08-28

## 2020-08-07 RX ORDER — HEPARIN SODIUM (PORCINE) LOCK FLUSH IV SOLN 100 UNIT/ML 100 UNIT/ML
5 SOLUTION INTRAVENOUS
Status: CANCELLED | OUTPATIENT
Start: 2020-08-28

## 2020-08-07 RX ORDER — DIPHENHYDRAMINE HCL 25 MG
50 CAPSULE ORAL
Status: CANCELLED
Start: 2020-08-28

## 2020-08-07 RX ADMIN — TEPROTUMUMAB 1150 MG: 500 INJECTION, POWDER, LYOPHILIZED, FOR SOLUTION INTRAVENOUS at 10:15

## 2020-08-07 NOTE — LETTER
8/7/2020         RE: Lucrecia Thornton  [address]  [place] [address]        Dear Colleague,    Thank you for referring your patient, Lucrecia Thornton, to the [facility] ADVANCED TREATMENT [place] SPECIALTY AND PROCEDURE. Please see a copy of my visit note below.    Nursing Note  Lucrecia Thornton presents [facility] for:   Chief Complaint   Patient presents with     Infusion     Tepezza     During [facility]'s [facility] appointment, orders from Dr. Bhat were completed.  Frequency: every 21 days    Progress note:  Patient identification verified by name and date of birth.  Assessment completed.  Vitals recorded in Doc Flowsheets.  Patient was provided with education regarding medication/procedure and possible side effects.  Patient verbalized understanding.     present during visit today: Not Applicable.    Treatment Conditions: Non-applicable.    Premedications: were not ordered.    Drug Waste Record: No    Infusion length and rate:  infusion given over approximately 60 minutes    Labs: were not ordered for this appointment.    Vascular access: peripheral IV placed today.    Post Infusion Assessment:  Patient tolerated infusion without incident.     Discharge Plan:   Follow up plan of care with: ongoing infusions at [facility].  Discharge instructions were reviewed with patient.  Patient/representative verbalized understanding of discharge instructions and all questions answered.  Patient discharged [facility] in stable condition.    Ro Gonsalez RN       Administrations This Visit     teprotumumab-trbw (TEPEZZA) 1,150 mg in sodium chloride 0.9 % 100 mL infusion     Admin Date  08/07/2020 Action  New Bag Dose  1150 mg Route  Intravenous Administered By  Ro Gonsalez RN                /82   Pulse 65   Temp 97.9  F (36.6  C) (Oral)   Resp 16          Again, thank you for allowing me to participate in the care of your patient.        Sincerely,        [facility]

## 2020-08-07 NOTE — PROGRESS NOTES
Nursing Note  Lucrecia Thornton presents [facility] for:   Chief Complaint   Patient presents with     Infusion     Tepezza     During today's [facility] appointment, orders from Dr. Bhat were completed.  Frequency: every 21 days    Progress note:  Patient identification verified by name and date of birth.  Assessment completed.  Vitals recorded in Doc Flowsheets.  Patient was provided with education regarding medication/procedure and possible side effects.  Patient verbalized understanding.     present during visit today: Not Applicable.    Treatment Conditions: Non-applicable.    Premedications: were not ordered.    Drug Waste Record: No    Infusion length and rate:  infusion given over approximately 60 minutes    Labs: were not ordered for this appointment.    Vascular access: peripheral IV placed today.    Post Infusion Assessment:  Patient tolerated infusion without incident.     Discharge Plan:   Follow up plan of care with: ongoing infusions at [facility].  Discharge instructions were reviewed with patient.  Patient/representative verbalized understanding of discharge instructions and all questions answered.  Patient discharged [facility] in stable condition.    Ro Gonsalez RN       Administrations This Visit     teprotumumab-trbw (TEPEZZA) 1,150 mg in sodium chloride 0.9 % 100 mL infusion     Admin Date  08/07/2020 Action  New Bag Dose  1150 mg Route  Intravenous Administered By  Ro Gonsalez RN                /82   Pulse 65   Temp 97.9  F (36.6  C) (Oral)   Resp 16

## 2020-08-27 ENCOUNTER — INFUSION THERAPY VISIT (OUTPATIENT)
Dept: INFUSION THERAPY | Facility: CLINIC | Age: 55
End: 2020-08-27
Attending: OPHTHALMOLOGY
Payer: COMMERCIAL

## 2020-08-27 ENCOUNTER — ALLIED HEALTH/NURSE VISIT (OUTPATIENT)
Dept: OPHTHALMOLOGY | Facility: CLINIC | Age: 55
End: 2020-08-27

## 2020-08-27 VITALS
HEART RATE: 52 BPM | DIASTOLIC BLOOD PRESSURE: 80 MMHG | WEIGHT: 125.5 LBS | SYSTOLIC BLOOD PRESSURE: 123 MMHG | RESPIRATION RATE: 16 BRPM | TEMPERATURE: 97.9 F | OXYGEN SATURATION: 98 %

## 2020-08-27 DIAGNOSIS — E07.9 THYROID EYE DISEASE: Primary | ICD-10-CM

## 2020-08-27 DIAGNOSIS — H57.89 THYROID EYE DISEASE: Primary | ICD-10-CM

## 2020-08-27 PROCEDURE — 25800030 ZZH RX IP 258 OP 636: Mod: ZF | Performed by: OPHTHALMOLOGY

## 2020-08-27 PROCEDURE — C9061 INJECTION, TEPROTUMUMAB-TRBW: HCPCS | Mod: JW,ZF | Performed by: OPHTHALMOLOGY

## 2020-08-27 PROCEDURE — 96365 THER/PROPH/DIAG IV INF INIT: CPT

## 2020-08-27 PROCEDURE — 25000128 H RX IP 250 OP 636: Mod: JW,ZF | Performed by: OPHTHALMOLOGY

## 2020-08-27 RX ORDER — HEPARIN SODIUM,PORCINE 10 UNIT/ML
5 VIAL (ML) INTRAVENOUS
Status: CANCELLED | OUTPATIENT
Start: 2020-08-28

## 2020-08-27 RX ORDER — ACETAMINOPHEN 325 MG/1
650 TABLET ORAL
Status: CANCELLED
Start: 2020-08-28

## 2020-08-27 RX ORDER — METHYLPREDNISOLONE SODIUM SUCCINATE 125 MG/2ML
125 INJECTION, POWDER, LYOPHILIZED, FOR SOLUTION INTRAMUSCULAR; INTRAVENOUS
Status: CANCELLED
Start: 2020-08-28

## 2020-08-27 RX ORDER — HEPARIN SODIUM (PORCINE) LOCK FLUSH IV SOLN 100 UNIT/ML 100 UNIT/ML
5 SOLUTION INTRAVENOUS
Status: CANCELLED | OUTPATIENT
Start: 2020-08-28

## 2020-08-27 RX ORDER — DIPHENHYDRAMINE HCL 25 MG
50 CAPSULE ORAL
Status: CANCELLED
Start: 2020-08-28

## 2020-08-27 RX ADMIN — TEPROTUMUMAB 1140 MG: 500 INJECTION, POWDER, LYOPHILIZED, FOR SOLUTION INTRAVENOUS at 10:31

## 2020-08-27 NOTE — PROGRESS NOTES
Nursing Note  Lucrecia Thornton presents [facility] for:   Chief Complaint   Patient presents with     Infusion     Tepezza     During today's [facility] appointment, orders from Dr.Michael Black were completed.  Frequency: every 21 days. Today is dose 6/8.     Progress note:  Patient identification verified by name and date of birth.  Assessment completed.  Vitals recorded in Doc Flowsheets.  Patient was provided with education regarding medication/procedure and possible side effects.  Patient verbalized understanding.     present during visit today: Not Applicable.    Treatment Conditions: non-applicable    Premedications: were not ordered.    Drug Waste Record: No    Infusion length and rate:  infusion given over approximately 60 minutes    Labs: were not ordered for this appointment.    Vascular access: peripheral IV placed today.    Post Infusion Assessment:  Patient tolerated infusion well and without incident. Declined printed AVS, discharged from [facility] in stable condition with return appointment 9/18/2020.    Discharge Plan:   Follow up plan of care with: ongoing infusions [facility]., ordering provider as scheduled. and after visit summary declined by patient  Discharge instructions were reviewed with patient.  Patient/representative verbalized understanding of discharge instructions and all questions answered.  Patient discharged [facility] in stable condition.    Kiki Jung RN       Administrations This Visit     teprotumumab-trbw (TEPEZZA) 1,140 mg in sodium chloride 0.9 % 134 mL infusion     Admin Date  08/27/2020 Action  New Bag Dose  1140 mg Rate  134 mL/hr Route  Intravenous Administered By  Kiki Jung RN                /80   Pulse 52   Temp 97.9  F (36.6  C) (Oral)   Resp 16   Wt 56.9 kg (125 lb 8 oz)   SpO2 98%

## 2020-08-27 NOTE — LETTER
8/27/2020         RE: Lucrecia Thornton  [address]  [place] [address]        Dear Colleague,    Thank you for referring your patient, Lucrecia Thornton, to the [facility] TREATMENT [place] SPECIALTY AND PROCEDURE. Please see a copy of my visit note below.    Nursing Note  Lucrecia Thornton [facility] for:   Chief Complaint   Patient presents with     Infusion     Tepezza     During today's [facility] appointment, orders from Dr.Michael Black were completed.  Frequency: every 21 days. Today is dose 6/8.     Progress note:  Patient identification verified by name and date of birth.  Assessment completed.  Vitals recorded in Doc Flowsheets.  Patient was provided with education regarding medication/procedure and possible side effects.  Patient verbalized understanding.     present during visit today: Not Applicable.    Treatment Conditions: non-applicable    Premedications: were not ordered.    Drug Waste Record: No    Infusion length and rate:  infusion given over approximately 60 minutes    Labs: were not ordered for this appointment.    Vascular access: peripheral IV placed today.    Post Infusion Assessment:  Patient tolerated infusion well and without incident. Declined printed AVS, discharged from [facility] in stable condition with return appointment 9/18/2020.    Discharge Plan:   Follow up plan of care with: ongoing infusions at [facility] ordering provider as scheduled. and after visit summary declined by patient  Discharge instructions were reviewed with patient.  Patient/representative verbalized understanding of discharge instructions and all questions answered.  Patient discharged from [facility] in stable condition.    Kiki Hernandez RN       Administrations This Visit     teprotumumab-trbw (TEPEZZA) 1,140 mg in sodium chloride 0.9 % 134 mL  infusion     Admin Date  08/27/2020 Action  New Bag Dose  1140 mg Rate  134 mL/hr Route  Intravenous Administered By  Kiki Jung, RN                /80   Pulse 52   Temp 97.9  F (36.6  C) (Oral)   Resp 16   Wt 56.9 kg (125 lb 8 oz)   SpO2 98%       Again, thank you for allowing me to participate in the care of your patient.        Sincerely,        [facility]

## 2020-08-27 NOTE — PATIENT INSTRUCTIONS
Dear Lucrecia Thornton    Thank you for choosing [facility] ([facility]) for your Tepezza infusion.  The following information is a summary of our appointment as well as important reminders.      We look forward in seeing you on your next appointment here at [facility] ([facility]).  Please don t hesitate to call us at [phone] to reschedule any of your appointments or to speak with one of the [facility] registered nurses.  It was a pleasure taking care of you today.    Sincerely,    [facility]  [facility]  [address] [place] [address]  [id number]  Phone:  [phone]

## 2020-09-18 ENCOUNTER — INFUSION THERAPY VISIT (OUTPATIENT)
Dept: INFUSION THERAPY | Facility: CLINIC | Age: 55
End: 2020-09-18
Attending: OPHTHALMOLOGY
Payer: COMMERCIAL

## 2020-09-18 ENCOUNTER — ALLIED HEALTH/NURSE VISIT (OUTPATIENT)
Dept: OPHTHALMOLOGY | Facility: CLINIC | Age: 55
End: 2020-09-18

## 2020-09-18 VITALS
SYSTOLIC BLOOD PRESSURE: 121 MMHG | DIASTOLIC BLOOD PRESSURE: 74 MMHG | RESPIRATION RATE: 16 BRPM | HEART RATE: 66 BPM | WEIGHT: 126.9 LBS | OXYGEN SATURATION: 99 % | TEMPERATURE: 97.8 F

## 2020-09-18 DIAGNOSIS — H57.89 THYROID EYE DISEASE: Primary | ICD-10-CM

## 2020-09-18 DIAGNOSIS — E07.9 THYROID EYE DISEASE: Primary | ICD-10-CM

## 2020-09-18 PROCEDURE — 25000128 H RX IP 250 OP 636: Mod: JW,ZF | Performed by: OPHTHALMOLOGY

## 2020-09-18 PROCEDURE — 96365 THER/PROPH/DIAG IV INF INIT: CPT

## 2020-09-18 PROCEDURE — C9061 INJECTION, TEPROTUMUMAB-TRBW: HCPCS | Mod: JW,ZF | Performed by: OPHTHALMOLOGY

## 2020-09-18 PROCEDURE — 25800030 ZZH RX IP 258 OP 636: Mod: ZF | Performed by: OPHTHALMOLOGY

## 2020-09-18 RX ORDER — ACETAMINOPHEN 325 MG/1
650 TABLET ORAL
Status: CANCELLED
Start: 2020-10-09

## 2020-09-18 RX ORDER — HEPARIN SODIUM (PORCINE) LOCK FLUSH IV SOLN 100 UNIT/ML 100 UNIT/ML
5 SOLUTION INTRAVENOUS
Status: CANCELLED | OUTPATIENT
Start: 2020-10-09

## 2020-09-18 RX ORDER — HEPARIN SODIUM,PORCINE 10 UNIT/ML
5 VIAL (ML) INTRAVENOUS
Status: CANCELLED | OUTPATIENT
Start: 2020-10-09

## 2020-09-18 RX ORDER — METHYLPREDNISOLONE SODIUM SUCCINATE 125 MG/2ML
125 INJECTION, POWDER, LYOPHILIZED, FOR SOLUTION INTRAMUSCULAR; INTRAVENOUS
Status: CANCELLED
Start: 2020-10-09

## 2020-09-18 RX ORDER — DIPHENHYDRAMINE HCL 25 MG
50 CAPSULE ORAL
Status: CANCELLED
Start: 2020-10-09

## 2020-09-18 RX ADMIN — TEPROTUMUMAB 1140 MG: 500 INJECTION, POWDER, LYOPHILIZED, FOR SOLUTION INTRAVENOUS at 10:37

## 2020-09-18 NOTE — PROGRESS NOTES
Nursing Note  Lucrecia Thornton presents [facility] for:   Chief Complaint   Patient presents with     Infusion     teprotumumab-trbw (TEPEZZA)   During today's [facility] appointment, orders from Srinivasan Bhat MD were completed.  Frequency: every 21 days. Today is dose 7/8.     Progress note:  Patient identification verified by name and date of birth.  Assessment completed.  Vitals recorded in Doc Flowsheets.  Patient was provided with education regarding medication/procedure and possible side effects.  Patient verbalized understanding.     present during visit today: Not Applicable.    Treatment Conditions: non-applicable    Premedications: were not ordered.    Drug Waste Record: No    Infusion length and rate:  infusion given over approximately 60 minutes    Labs: were not ordered for this appointment.    Vascular access: peripheral IV placed today.    Post Infusion Assessment:  Patient tolerated infusion well and without incident. Declined printed AVS, discharged from [facility] in stable condition.  Discharge Plan:   Follow up plan of care with: ongoing infusions at [facility]., ordering provider as scheduled. and after visit summary declined by patient  Discharge instructions were reviewed with patient.  Patient/representative verbalized understanding of discharge instructions and all questions answered.  Patient discharged from [facility] in stable condition.  Lisa Arriaga RN    Administrations This Visit     teprotumumab-trbw (TEPEZZA) 1,140 mg in sodium chloride 0.9 % 134 mL infusion     Admin Date  09/18/2020 Action  New Bag Dose  1140 mg Rate  134 mL/hr Route  Intravenous Administered By  Lisa Arriaga RN                /72   Pulse 62   Temp 97.8  F (36.6  C) (Oral)   Resp 16   Wt 57.6 kg (126 lb 14.4 oz)   SpO2 99%

## 2020-09-18 NOTE — LETTER
9/18/2020         RE: Lucrecia Thornton  [address]  [place] [address]        Dear Colleague,    Thank you for referring your patient, Lucrecia Thornton, to the [facility] TREATMENT [place] SPECIALTY AND PROCEDURE. Please see a copy of my visit note below.    Nursing Note  Lucrecia Thornton presents [facility] for:   Chief Complaint   Patient presents with     Infusion     teprotumumab-trbw (TEPEZZA)   During today's [facility] appointment, orders from Srinivasan Bhat MD were completed.  Frequency: every 21 days. Today is dose 7/8.     Progress note:  Patient identification verified by name and date of birth.  Assessment completed.  Vitals recorded in Doc Flowsheets.  Patient was provided with education regarding medication/procedure and possible side effects.  Patient verbalized understanding.     present during visit today: Not Applicable.    Treatment Conditions: non-applicable    Premedications: were not ordered.    Drug Waste Record: No    Infusion length and rate:  infusion given over approximately 60 minutes    Labs: were not ordered for this appointment.    Vascular access: peripheral IV placed today.    Post Infusion Assessment:  Patient tolerated infusion well and without incident. Declined printed AVS, discharged from [facility] in stable condition.  Discharge Plan:   Follow up plan of care with: ongoing infusions at [facility]., ordering provider as scheduled. and after visit summary declined by patient  Discharge instructions were reviewed with patient.  Patient/representative verbalized understanding of discharge instructions and all questions answered.  Patient discharged [facility] in stable condition.  Lisa Arriaga RN    Administrations This Visit     teprotumumab-trbw (TEPEZZA) 1,140 mg in sodium chloride 0.9 % 134 mL  infusion     Admin Date  09/18/2020 Action  New Bag Dose  1140 mg Rate  134 mL/hr Route  Intravenous Administered By  Lisa Arriaga RN                /72   Pulse 62   Temp 97.8  F (36.6  C) (Oral)   Resp 16   Wt 57.6 kg (126 lb 14.4 oz)   SpO2 99%         Again, thank you for allowing me to participate in the care of your patient.        Sincerely,        [facility]

## 2020-10-08 ENCOUNTER — INFUSION THERAPY VISIT (OUTPATIENT)
Dept: INFUSION THERAPY | Facility: CLINIC | Age: 55
End: 2020-10-08
Attending: OPHTHALMOLOGY
Payer: COMMERCIAL

## 2020-10-08 ENCOUNTER — ALLIED HEALTH/NURSE VISIT (OUTPATIENT)
Dept: OPHTHALMOLOGY | Facility: CLINIC | Age: 55
End: 2020-10-08
Payer: COMMERCIAL

## 2020-10-08 VITALS
WEIGHT: 126.5 LBS | OXYGEN SATURATION: 100 % | DIASTOLIC BLOOD PRESSURE: 74 MMHG | SYSTOLIC BLOOD PRESSURE: 122 MMHG | TEMPERATURE: 97.7 F | RESPIRATION RATE: 16 BRPM | HEART RATE: 63 BPM

## 2020-10-08 DIAGNOSIS — H57.89 THYROID EYE DISEASE: Primary | ICD-10-CM

## 2020-10-08 DIAGNOSIS — E07.9 THYROID EYE DISEASE: Primary | ICD-10-CM

## 2020-10-08 PROCEDURE — 258N000003 HC RX IP 258 OP 636: Performed by: OPHTHALMOLOGY

## 2020-10-08 PROCEDURE — 96365 THER/PROPH/DIAG IV INF INIT: CPT

## 2020-10-08 PROCEDURE — 99207 PR NO CHARGE COORDINATED CARE PS: CPT

## 2020-10-08 PROCEDURE — 250N000011 HC RX IP 250 OP 636: Mod: JW | Performed by: OPHTHALMOLOGY

## 2020-10-08 RX ORDER — HEPARIN SODIUM,PORCINE 10 UNIT/ML
5 VIAL (ML) INTRAVENOUS
Status: CANCELLED | OUTPATIENT
Start: 2020-10-09

## 2020-10-08 RX ORDER — HEPARIN SODIUM (PORCINE) LOCK FLUSH IV SOLN 100 UNIT/ML 100 UNIT/ML
5 SOLUTION INTRAVENOUS
Status: CANCELLED | OUTPATIENT
Start: 2020-10-09

## 2020-10-08 RX ORDER — METHYLPREDNISOLONE SODIUM SUCCINATE 125 MG/2ML
125 INJECTION, POWDER, LYOPHILIZED, FOR SOLUTION INTRAMUSCULAR; INTRAVENOUS
Status: CANCELLED
Start: 2020-10-09

## 2020-10-08 RX ORDER — DIPHENHYDRAMINE HCL 25 MG
50 CAPSULE ORAL
Status: CANCELLED
Start: 2020-10-09

## 2020-10-08 RX ORDER — ACETAMINOPHEN 325 MG/1
650 TABLET ORAL
Status: CANCELLED
Start: 2020-10-09

## 2020-10-08 RX ADMIN — TEPROTUMUMAB 1150 MG: 500 INJECTION, POWDER, LYOPHILIZED, FOR SOLUTION INTRAVENOUS at 10:47

## 2020-10-08 NOTE — PROGRESS NOTES
Nursing Note  Lucrecia Thornton presents [facility] for:   Chief Complaint   Patient presents with     Infusion     IV Tepezza   During today's [facility] appointment, orders from Srinivasan Bhat MD were completed.  Frequency: every 21 days. Today is dose 8/8    Progress note:  Patient identification verified by name and date of birth.  Assessment completed.  Vitals recorded in Doc Flowsheets.  Patient was provided with education regarding medication/procedure and possible side effects.  Patient verbalized understanding.     present during visit today: Not Applicable.    Treatment Conditions: non-applicable    Premedications: were not ordered.    Drug Waste Record: No    Infusion length and rate:  infusion given over approximately 60 minutes    Labs: were not ordered for this appointment.    Vascular access: peripheral IV placed today.    Note: patient reported bilateral leg cramps starting after 7th dose, which is a side effect of medication. Primarily at night time. Feeling well today, with no cramping. Pt states MD is not aware of her symptoms, pt will contact provider if cramps return.     Post Infusion Assessment:  Patient tolerated infusion well and without incident. Declined printed AVS, discharged from [facility] in stable condition.    Discharge Plan:   Follow up plan of care with: ordering provider as scheduled.   Discharge instructions were reviewed with patient.  Patient/representative verbalized understanding of discharge instructions and all questions answered.  Patient discharged [facility] in stable condition.    Administrations This Visit     teprotumumab-trbw (TEPEZZA) 1,150 mg in sodium chloride 0.9 % 134 mL infusion     Admin Date  10/08/2020 Action  New Bag Dose  1,150 mg Rate  134 mL/hr Route  Intravenous Administered By  Mayito Lutz RN              BP (!) 141/87   Pulse 57    Temp 97.7  F (36.5  C) (Oral)   Resp 18   Wt 57.4 kg (126 lb 8 oz)   SpO2 100%

## 2020-10-08 NOTE — LETTER
10/8/2020         RE: Lucrecia Thornton  [address]  [place] [address]        Dear Colleague,    Thank you for referring your patient, Lucrecia Thornton, to the [facility] TREATMENT [facility]. Please see a copy of my visit note below.    Nursing Note  Lucrecia Thornton presents [facility] for:   Chief Complaint   Patient presents with     Infusion     IV Tepezza   During today's [facility] appointment, orders from Srinivasan Bhat MD were completed.  Frequency: every 21 days. Today is dose 8/8    Progress note:  Patient identification verified by name and date of birth.  Assessment completed.  Vitals recorded in Doc Flowsheets.  Patient was provided with education regarding medication/procedure and possible side effects.  Patient verbalized understanding.     present during visit today: Not Applicable.    Treatment Conditions: non-applicable    Premedications: were not ordered.    Drug Waste Record: No    Infusion length and rate:  infusion given over approximately 60 minutes    Labs: were not ordered for this appointment.    Vascular access: peripheral IV placed today.    Note: patient reported bilateral leg cramps starting after 7th dose, which is a side effect of medication. Primarily at night time. Feeling well today, with no cramping. Pt states MD is not aware of her symptoms, pt will contact provider if cramps return.     Post Infusion Assessment:  Patient tolerated infusion well and without incident. Declined printed AVS, discharged from [facility] in stable condition.    Discharge Plan:   Follow up plan of care with: ordering provider as scheduled.   Discharge instructions were reviewed with patient.  Patient/representative verbalized understanding of discharge instructions and all questions answered.  Patient discharged from [facility] in stable  condition.    Administrations This Visit     teprotumumab-trbw (TEPEZZA) 1,150 mg in sodium chloride 0.9 % 134 mL infusion     Admin Date  10/08/2020 Action  New Bag Dose  1,150 mg Rate  134 mL/hr Route  Intravenous Administered By  Mayito Lutz, RN              BP (!) 141/87   Pulse 57   Temp 97.7  F (36.5  C) (Oral)   Resp 18   Wt 57.4 kg (126 lb 8 oz)   SpO2 100%           Again, thank you for allowing me to participate in the care of your patient.        Sincerely,        [facility]

## 2020-12-21 ENCOUNTER — OFFICE VISIT (OUTPATIENT)
Dept: OPHTHALMOLOGY | Facility: CLINIC | Age: 55
End: 2020-12-21
Attending: OPHTHALMOLOGY
Payer: COMMERCIAL

## 2020-12-21 DIAGNOSIS — H02.539 EYELID RETRACTION, UNSPECIFIED LATERALITY: ICD-10-CM

## 2020-12-21 DIAGNOSIS — E07.9 THYROID EYE DISEASE: Primary | ICD-10-CM

## 2020-12-21 DIAGNOSIS — H57.89 THYROID EYE DISEASE: Primary | ICD-10-CM

## 2020-12-21 PROCEDURE — 92012 INTRM OPH EXAM EST PATIENT: CPT | Mod: GC | Performed by: OPHTHALMOLOGY

## 2020-12-21 PROCEDURE — G0463 HOSPITAL OUTPT CLINIC VISIT: HCPCS | Performed by: TECHNICIAN/TECHNOLOGIST

## 2020-12-21 ASSESSMENT — CUP TO DISC RATIO
OD_RATIO: 0.4
OS_RATIO: 0.35

## 2020-12-21 ASSESSMENT — VISUAL ACUITY
OD_SC: 20/20
OS_SC: 20/20
OS_SC+: +
METHOD: SNELLEN - LINEAR

## 2020-12-21 ASSESSMENT — EXTERNAL EXAM - LEFT EYE: OS_EXAM: NORMAL

## 2020-12-21 ASSESSMENT — TONOMETRY
IOP_METHOD: SINGLE ICARE
OS_IOP_MMHG: 14
IOP_METHOD: UPGAZE ICARE
OD_IOP_MMHG: 15
OS_IOP_MMHG: 13
OD_IOP_MMHG: 14

## 2020-12-21 ASSESSMENT — SLIT LAMP EXAM - LIDS
COMMENTS: NORMAL
COMMENTS: NORMAL

## 2020-12-21 ASSESSMENT — EXTERNAL EXAM - RIGHT EYE: OD_EXAM: NORMAL

## 2020-12-21 NOTE — NURSING NOTE
Chief Complaint(s) and History of Present Illness(es)     Thyroid Disease     Laterality: both eyes    Associated symptoms: redness, eye pain and dryness    Treatments tried: eye drops    Comments: Last IV tepezza 10/8/2020, using refresh drops daily prn, gel drops at night, eyes are feel more dry, no VA change or diplopia              Comments     11/4/2020  TSH 0.35 - 4.94 uIU/mL 0.48

## 2020-12-21 NOTE — PROGRESS NOTES
Assessment & Plan     HPI: follow up Thyroid eye disease.      Patient feels that she is almost back to her normal. There was some burning sensation on the lower part of both eyes. She uses frequent artificial tears and they are helpful.  She finished her Tepezza on 10/8/2020.   She is following her PCP Dr. Bae for her thyroid check ups.       Eye symptoms (since when):   Proptosis (better/worse/same since last visit): better  Diplopia(better/worse/same since last visit): only in the evening, better  Eyelid retraction(better/worse/same since last visit): none  Tearing(better/worse/same since last visit): better  Redness (better/worse/same since last visit): better   Pain ((better/worse/same since last visit): better, burning sensation   Pain to move the eyes (better/worse/same since last visit): none   Blurred vision: none        Exam:   Adam (base): 20/20 (90)  Better/worse same: same     Strabismus (better/worse/same): none  Eyelid retraction (better/worse/same): same      Lucrecia Thornton is a 54 year old female with the following diagnoses:   1. Thyroid eye disease    2. Eyelid retraction, unspecified laterality       Follow up Thyroid eye disease status-post tepezza injections.  SHANI score is now zero.  Follow up 1 year or sooner as needed for worsening symptoms.     PLAN  Bilateral upper lids recession (skin + fat + levator +/- FTB)  R/B/A with Dr. Kessler           Attending Physician Attestation:  Complete documentation of historical and exam elements from today's encounter can be found in the full encounter summary report (not reduplicated in this progress note).  I personally obtained the chief complaint(s) and history of present illness.  I confirmed and edited as necessary the review of systems, past medical/surgical history, family history, social history, and examination findings as documented by others; and I examined the patient myself.  I personally reviewed the relevant tests, images,  and reports as documented above.  I formulated and edited as necessary the assessment and plan and discussed the findings and management plan with the patient and family. I personally reviewed the ophthalmic test(s) associated with this encounter, agree with the interpretation(s) as documented by the resident/fellow, and have edited the corresponding report(s) as necessary.  - Srinivasan Richardson MD  Ophthalmology PGY-3

## 2020-12-29 ENCOUNTER — TELEPHONE (OUTPATIENT)
Dept: OPHTHALMOLOGY | Facility: CLINIC | Age: 55
End: 2020-12-29

## 2020-12-29 NOTE — TELEPHONE ENCOUNTER
Spoke with patient to schedule surgery with Dr. Kessler.    Surgery was scheduled on 3/17 at [facility]  Patient will have H&P at [facility]     Patient is aware a COVID-19 test is needed before their procedure. The test should be with-in 4 days of their procedure.   Test Details: Date 3/13 Location [facility]    Patient was encouraged to have her covid test placed at a [place] location but patient declined.    Patient was advised that she must have her covid test placed on 3.13 at the earliest and 3/15 at the latest.    Post-Op visit was scheduled on 3/29  Patient is aware a / is needed day of surgery.   Surgery packet was mailed 12/29, patient has my direct contact information for any further questions.

## 2021-01-04 ENCOUNTER — HEALTH MAINTENANCE LETTER (OUTPATIENT)
Age: 56
End: 2021-01-04

## 2021-03-01 DIAGNOSIS — Z11.59 ENCOUNTER FOR SCREENING FOR OTHER VIRAL DISEASES: ICD-10-CM

## 2021-03-13 DIAGNOSIS — Z11.59 ENCOUNTER FOR SCREENING FOR OTHER VIRAL DISEASES: ICD-10-CM

## 2021-03-13 LAB
SARS-COV-2 RNA RESP QL NAA+PROBE: NORMAL
SPECIMEN SOURCE: NORMAL

## 2021-03-13 PROCEDURE — U0003 INFECTIOUS AGENT DETECTION BY NUCLEIC ACID (DNA OR RNA); SEVERE ACUTE RESPIRATORY SYNDROME CORONAVIRUS 2 (SARS-COV-2) (CORONAVIRUS DISEASE [COVID-19]), AMPLIFIED PROBE TECHNIQUE, MAKING USE OF HIGH THROUGHPUT TECHNOLOGIES AS DESCRIBED BY CMS-2020-01-R: HCPCS | Performed by: OPHTHALMOLOGY

## 2021-03-13 PROCEDURE — U0005 INFEC AGEN DETEC AMPLI PROBE: HCPCS | Performed by: OPHTHALMOLOGY

## 2021-03-14 LAB
LABORATORY COMMENT REPORT: NORMAL
SARS-COV-2 RNA RESP QL NAA+PROBE: NEGATIVE
SPECIMEN SOURCE: NORMAL

## 2021-03-16 ENCOUNTER — ANESTHESIA EVENT (OUTPATIENT)
Dept: SURGERY | Facility: AMBULATORY SURGERY CENTER | Age: 56
End: 2021-03-16

## 2021-03-16 NOTE — ANESTHESIA PREPROCEDURE EVALUATION
Anesthesia Pre-Procedure Evaluation    Patient: Lucrecia Thornton   MRN: [id number] : 1965        Preoperative Diagnosis: Eyelid retraction, unspecified laterality [H02.539]  Thyroid eye disease [E05.00]   Procedure : Procedure(s):  Bilateral upper lid retraction repair     Past Medical History:   Diagnosis Date     Thyroid eye disease       Past Surgical History:   Procedure Laterality Date     LASIK Bilateral 2000      Allergies   Allergen Reactions     Ondansetron Other (See Comments)     Arm jerking      Social History     Tobacco Use     Smoking status: Never Smoker     Smokeless tobacco: Never Used   Substance Use Topics     Alcohol use: Not on file      Wt Readings from Last 1 Encounters:   10/08/20 57.4 kg (126 lb 8 oz)           Physical Exam    Airway        Mallampati: II   TM distance: > 3 FB   Neck ROM: full   Mouth opening: > 3 cm    Respiratory Devices and Support         Dental  no notable dental history         Cardiovascular   cardiovascular exam normal          Pulmonary   pulmonary exam normal                OUTSIDE LABS:  CBC: No results found for: WBC, HGB, HCT, PLT  BMP: No results found for: NA, POTASSIUM, CHLORIDE, CO2, BUN, CR, GLC  COAGS: No results found for: PTT, INR, FIBR  POC: No results found for: BGM, HCG, HCGS  HEPATIC: No results found for: ALBUMIN, PROTTOTAL, ALT, AST, GGT, ALKPHOS, BILITOTAL, BILIDIRECT, CM  OTHER: No results found for: PH, LACT, A1C, RODRIGUEZ, PHOS, MAG, LIPASE, AMYLASE, TSH, T4, T3, CRP, SED    Anesthesia Plan    ASA Status:  2   NPO Status:  NPO Appropriate    Anesthesia Type: MAC.     - Reason for MAC: straight local not clinically adequate   Induction: Intravenous, Propofol.   Maintenance: TIVA.        Consents    Anesthesia Plan(s) and associated risks, benefits, and realistic alternatives discussed. Questions answered and patient/representative(s) expressed understanding.     - Discussed with:  Patient      - Extended Intubation/Ventilatory  Support Discussed: No.      - Patient is DNR/DNI Status: No    Use of blood products discussed: No .     Postoperative Care    Pain management: Multi-modal analgesia.   PONV prophylaxis: Ondansetron (or other 5HT-3), Dexamethasone or Solumedrol     Comments:         H&P reviewed: Unable to attach H&P to encounter due to EHR limitations. H&P Update: appropriate H&P reviewed, patient examined. No interval changes since H&P (within 30 days).         Mychal Yang MD

## 2021-03-17 ENCOUNTER — ANESTHESIA (OUTPATIENT)
Dept: SURGERY | Facility: AMBULATORY SURGERY CENTER | Age: 56
End: 2021-03-17

## 2021-03-17 ENCOUNTER — HOSPITAL ENCOUNTER (OUTPATIENT)
Facility: AMBULATORY SURGERY CENTER | Age: 56
Discharge: HOME | End: 2021-03-17
Attending: OPHTHALMOLOGY | Admitting: OPHTHALMOLOGY
Payer: COMMERCIAL

## 2021-03-17 VITALS
HEIGHT: 66 IN | HEART RATE: 59 BPM | SYSTOLIC BLOOD PRESSURE: 128 MMHG | OXYGEN SATURATION: 99 % | WEIGHT: 120 LBS | RESPIRATION RATE: 16 BRPM | DIASTOLIC BLOOD PRESSURE: 85 MMHG | TEMPERATURE: 97.6 F | BODY MASS INDEX: 19.29 KG/M2

## 2021-03-17 DIAGNOSIS — H02.539 EYELID RETRACTION, UNSPECIFIED LATERALITY: ICD-10-CM

## 2021-03-17 DIAGNOSIS — E07.9 THYROID EYE DISEASE: ICD-10-CM

## 2021-03-17 DIAGNOSIS — H57.89 THYROID EYE DISEASE: ICD-10-CM

## 2021-03-17 PROCEDURE — 67911 REVISE EYELID DEFECT: CPT | Mod: E1

## 2021-03-17 RX ORDER — FLUTICASONE PROPIONATE 50 MCG
1 SPRAY, SUSPENSION (ML) NASAL DAILY
COMMUNITY

## 2021-03-17 RX ORDER — OXYCODONE HYDROCHLORIDE 5 MG/1
5 TABLET ORAL EVERY 4 HOURS PRN
Status: DISCONTINUED | OUTPATIENT
Start: 2021-03-17 | End: 2021-03-18 | Stop reason: HOSPADM

## 2021-03-17 RX ORDER — NALOXONE HYDROCHLORIDE 0.4 MG/ML
0.2 INJECTION, SOLUTION INTRAMUSCULAR; INTRAVENOUS; SUBCUTANEOUS
Status: DISCONTINUED | OUTPATIENT
Start: 2021-03-17 | End: 2021-03-18 | Stop reason: HOSPADM

## 2021-03-17 RX ORDER — PROPOFOL 10 MG/ML
INJECTION, EMULSION INTRAVENOUS PRN
Status: DISCONTINUED | OUTPATIENT
Start: 2021-03-17 | End: 2021-03-17

## 2021-03-17 RX ORDER — FENTANYL CITRATE 50 UG/ML
25-50 INJECTION, SOLUTION INTRAMUSCULAR; INTRAVENOUS
Status: DISCONTINUED | OUTPATIENT
Start: 2021-03-17 | End: 2021-03-17 | Stop reason: HOSPADM

## 2021-03-17 RX ORDER — NALOXONE HYDROCHLORIDE 0.4 MG/ML
0.4 INJECTION, SOLUTION INTRAMUSCULAR; INTRAVENOUS; SUBCUTANEOUS
Status: DISCONTINUED | OUTPATIENT
Start: 2021-03-17 | End: 2021-03-18 | Stop reason: HOSPADM

## 2021-03-17 RX ORDER — ALBUTEROL SULFATE 0.83 MG/ML
2.5 SOLUTION RESPIRATORY (INHALATION) EVERY 4 HOURS PRN
Status: DISCONTINUED | OUTPATIENT
Start: 2021-03-17 | End: 2021-03-17 | Stop reason: HOSPADM

## 2021-03-17 RX ORDER — ERYTHROMYCIN 5 MG/G
OINTMENT OPHTHALMIC
Qty: 3.5 G | Refills: 0 | Status: SHIPPED | OUTPATIENT
Start: 2021-03-17

## 2021-03-17 RX ORDER — FENTANYL CITRATE 50 UG/ML
INJECTION, SOLUTION INTRAMUSCULAR; INTRAVENOUS PRN
Status: DISCONTINUED | OUTPATIENT
Start: 2021-03-17 | End: 2021-03-17

## 2021-03-17 RX ORDER — KETOROLAC TROMETHAMINE 30 MG/ML
30 INJECTION, SOLUTION INTRAMUSCULAR; INTRAVENOUS EVERY 6 HOURS PRN
Status: DISCONTINUED | OUTPATIENT
Start: 2021-03-17 | End: 2021-03-18 | Stop reason: HOSPADM

## 2021-03-17 RX ORDER — DEXAMETHASONE SODIUM PHOSPHATE 4 MG/ML
INJECTION, SOLUTION INTRA-ARTICULAR; INTRALESIONAL; INTRAMUSCULAR; INTRAVENOUS; SOFT TISSUE PRN
Status: DISCONTINUED | OUTPATIENT
Start: 2021-03-17 | End: 2021-03-17

## 2021-03-17 RX ORDER — SODIUM CHLORIDE, SODIUM LACTATE, POTASSIUM CHLORIDE, CALCIUM CHLORIDE 600; 310; 30; 20 MG/100ML; MG/100ML; MG/100ML; MG/100ML
INJECTION, SOLUTION INTRAVENOUS CONTINUOUS
Status: DISCONTINUED | OUTPATIENT
Start: 2021-03-17 | End: 2021-03-18 | Stop reason: HOSPADM

## 2021-03-17 RX ORDER — PROPOFOL 10 MG/ML
INJECTION, EMULSION INTRAVENOUS CONTINUOUS PRN
Status: DISCONTINUED | OUTPATIENT
Start: 2021-03-17 | End: 2021-03-17

## 2021-03-17 RX ORDER — LIDOCAINE HYDROCHLORIDE AND EPINEPHRINE 10; 10 MG/ML; UG/ML
INJECTION, SOLUTION INFILTRATION; PERINEURAL PRN
Status: DISCONTINUED | OUTPATIENT
Start: 2021-03-17 | End: 2021-03-17 | Stop reason: HOSPADM

## 2021-03-17 RX ORDER — ACETAMINOPHEN 325 MG/1
975 TABLET ORAL ONCE
Status: COMPLETED | OUTPATIENT
Start: 2021-03-17 | End: 2021-03-17

## 2021-03-17 RX ORDER — MEPERIDINE HYDROCHLORIDE 25 MG/ML
12.5 INJECTION INTRAMUSCULAR; INTRAVENOUS; SUBCUTANEOUS
Status: DISCONTINUED | OUTPATIENT
Start: 2021-03-17 | End: 2021-03-18 | Stop reason: HOSPADM

## 2021-03-17 RX ORDER — LIDOCAINE HYDROCHLORIDE 20 MG/ML
INJECTION, SOLUTION INFILTRATION; PERINEURAL PRN
Status: DISCONTINUED | OUTPATIENT
Start: 2021-03-17 | End: 2021-03-17

## 2021-03-17 RX ORDER — SODIUM CHLORIDE, SODIUM LACTATE, POTASSIUM CHLORIDE, CALCIUM CHLORIDE 600; 310; 30; 20 MG/100ML; MG/100ML; MG/100ML; MG/100ML
INJECTION, SOLUTION INTRAVENOUS CONTINUOUS
Status: DISCONTINUED | OUTPATIENT
Start: 2021-03-17 | End: 2021-03-17 | Stop reason: HOSPADM

## 2021-03-17 RX ORDER — ERYTHROMYCIN 5 MG/G
OINTMENT OPHTHALMIC PRN
Status: DISCONTINUED | OUTPATIENT
Start: 2021-03-17 | End: 2021-03-17 | Stop reason: HOSPADM

## 2021-03-17 RX ORDER — TETRACAINE HYDROCHLORIDE 5 MG/ML
SOLUTION OPHTHALMIC PRN
Status: DISCONTINUED | OUTPATIENT
Start: 2021-03-17 | End: 2021-03-17 | Stop reason: HOSPADM

## 2021-03-17 RX ORDER — LIDOCAINE 40 MG/G
CREAM TOPICAL
Status: DISCONTINUED | OUTPATIENT
Start: 2021-03-17 | End: 2021-03-17 | Stop reason: HOSPADM

## 2021-03-17 RX ADMIN — PROPOFOL 40 MG: 10 INJECTION, EMULSION INTRAVENOUS at 07:38

## 2021-03-17 RX ADMIN — LIDOCAINE HYDROCHLORIDE 60 MG: 20 INJECTION, SOLUTION INFILTRATION; PERINEURAL at 07:38

## 2021-03-17 RX ADMIN — DEXAMETHASONE SODIUM PHOSPHATE 4 MG: 4 INJECTION, SOLUTION INTRA-ARTICULAR; INTRALESIONAL; INTRAMUSCULAR; INTRAVENOUS; SOFT TISSUE at 07:40

## 2021-03-17 RX ADMIN — SODIUM CHLORIDE, SODIUM LACTATE, POTASSIUM CHLORIDE, CALCIUM CHLORIDE: 600; 310; 30; 20 INJECTION, SOLUTION INTRAVENOUS at 06:22

## 2021-03-17 RX ADMIN — PROPOFOL 20 MG: 10 INJECTION, EMULSION INTRAVENOUS at 07:57

## 2021-03-17 RX ADMIN — FENTANYL CITRATE 50 MCG: 50 INJECTION, SOLUTION INTRAMUSCULAR; INTRAVENOUS at 07:34

## 2021-03-17 RX ADMIN — ACETAMINOPHEN 975 MG: 325 TABLET ORAL at 06:21

## 2021-03-17 RX ADMIN — PROPOFOL 100 MCG/KG/MIN: 10 INJECTION, EMULSION INTRAVENOUS at 07:38

## 2021-03-17 ASSESSMENT — MIFFLIN-ST. JEOR: SCORE: 1166.07

## 2021-03-17 NOTE — BRIEF OP NOTE
[facility] Brief Operative Note    Pre-operative diagnosis: Eyelid retraction, unspecified laterality [H02.539]  Thyroid eye disease [E05.00]   Post-operative diagnosis Same   Procedure: Procedure(s):  Bilateral upper lid retraction repair   Surgeon(s): Surgeon(s) and Role:     * Ger Kessler MD - Primary     * Jackie Pereira MD - Resident - Observing     * Derik Ibrahim MD - Fellow - Assisting   Estimated blood loss: 2mL    Specimens: * No specimens in log *   Findings: As expected

## 2021-03-17 NOTE — DISCHARGE INSTRUCTIONS
Post-operative Instructions    Ophthalmic Plastic and Reconstructive Surgery  Ger Kessler M.D.  Derik Ibrahim M.D., M.P.H.    All instructions apply to the operated eye(s) or eyelid(s)    What to expect after surgery:    There will be some swelling, bruising, and likely a black eye (even into the lower eyelids and cheeks). Also expect crusting and discharge from the eye and/or incisions.     A small amount of surface bleeding is normal for the first 48 hours after surgery.    You may notice some bloody tears for the first few days after surgery. This is normal.    Your eye(s) and eyelid(s) may be painful and tender. This is normal after surgery. Use the pain medication as prescribed. If your pain does not improve despite the medication, contact the office.    Wound care and personal care:    If a patch or bandage has been placed, please leave this in place until seen in clinic. Prevent the bandage from getting wet.     Apply ice compresses 15 minutes on 15 minutes off while awake for the first 2 days after surgery, then switch to warm compresses 4 times a day until seen by your physician.     For warm packs you can place a cup of dry uncooked rice in a clean cotton sock. Place sock in microwave 30 seconds to one minute. Next place the warm sock into a plastic bag and wrap the bag with clean warm wet washcloth and place over operated eye.      You may shower or wash your hair the day after surgery. Do not bathe or go swimming for 1 week to prevent contamination of your wounds.    Do not apply make-up to the eyes or eyelids for 2 weeks after surgery.    Activity restrictions and driving:    Avoid heavy lifting, bending, exercise or strenuous activity for 1 week after surgery.    You may resume other activities and return to work as tolerated.    You may not resume driving until have you stopped using narcotic pain medications(such as Norco, Percocet, Tylenol #3).    Sinus Precautions for 1 week: Sneeze  with mouth open. Cough with mouth open. No blowing nose. No straws. No bending over.    Medications:    Restart all your regular home medications and eye drops today. If you take Plavix or Aspirin on a regular basis, wait for 3 days after your surgery before restarting these in order to decrease the risk of bleeding complications.    Avoid aspirin and aspirin-like medications (Motrin, Aleve, Ibuprofen, Yin-Bath etc) for 5 days to reduce the risk of bleeding. You may take Tylenol (acetaminophen) for pain.    In addition to your home medications, take the following post-operative medications as prescribed by your physician:    Apply antibiotic ointment (erythromycin) to all sutures three times a day, and into the operated eye(s) at night.     Take 1 to 2 pain pills (norco or oxycodone as prescribed) as needed for pain up to every 4 hours.    The pain pills may make you drowsy. You must not drive a car, operate heavy machinery or drink alcohol while taking them.    The pain pills may cause constipation and nausea. Take them with some food to prevent a stomach upset. If you continue to experience nausea, call your physician.      WARNING: All the prescription pain medications listed above contain Tylenol (acetaminophen). You must not take more than 4,000 mg of acetaminophen per 24-hour period. This is equivalent to 6 tablets of Darvocet, 8 tablets of Vicodin, or 12 tablets of Norco, Percocet or Tylenol #3. If you take other over-the-counter medications containing acetaminophen, you must take the amount of acetaminophen into account and reduce the number of prescribed pain pills accordingly.    Contact information and follow-up:    Return to the Eye Clinic for a follow-up appointment with your physician as  scheduled. If no appointment has been scheduled, call [phone] for an  appointment with Dr. Kessler within 1 to 2 weeks from your date of surgery.  -     Please email a few photos of your eye(s) or other  operative site(s) to [email or web address] prior to your follow up visit.      For severe pain, bleeding, or loss of vision, call the Eye Clinic at [phone].    After hours or on weekends and holidays, call [phone] and ask to speak with the ophthalmologist on call.        [facility]  Home Care Following Anesthesia  For 24 hours after surgery:  1. Get plenty of rest.  A responsible adult must stay with you for at least 24 hours after you leave the surgery center.  2. Do not drive or use heavy equipment.  If you have weakness or tingling, don't drive or use heavy equipment until this feeling goes away.   3. Do not drink alcohol.   4. Avoid strenuous or risky activities.  Ask for help when climbing stairs.  5. You may feel lightheaded.  IF so, sit for a few minutes before standing.  Have someone help you get up.   6. If you have nausea (feel sick to your stomach): Drink only clear liquids such as apple juice, ginger ale, broth or 7-Up.  Rest may also help.  Be sure to drink enough fluids.  Move to a regular diet as you feel able.   7. You may have a slight fever.  Call the doctor if your fever is over 100 F (37.7 C) (taken under the tongue) or lasts longer than 24 hours.  8. You may have a dry mouth, a sore throat, muscle aches or trouble sleeping. These should go away after 24 hours.  9. Do not make important or legal decisions.               Tips for taking pain medications  To get the best pain relief possible, remember these points:    Take pain medications as directed, before pain becomes severe.    Pain medication can upset your stomach: taking it with food may help.    Constipation is a common side effect of pain medication. Drink plenty of  fluids.    Eat foods high in fiber. Take a stool softener if recommended by your doctor or pharmacist.    Do not drink alcohol, drive or operate machinery while taking pain medications.    Ask about other ways to control pain,  such as with heat, ice or relaxation.    Tylenol/Acetaminophen Consumption - You received 975 mg at 6:30 am.  Your next available dose is at 12:30 pm  To help encourage the safe use of acetaminophen, the makers of TYLENOL  have lowered the maximum daily dose for single-ingredient Extra Strength TYLENOL  (acetaminophen) products sold in the [place] from 8 pills per day (4,000 mg) to 6 pills per day (3,000 mg). The dosing interval has also changed from 2 pills every 4-6 hours to 2 pills every 6 hours.    If you feel your pain relief is insufficient, you may take Tylenol/Acetaminophen in addition to your narcotic pain medication.     Be careful not to exceed 3,000 mg of Tylenol/Acetaminophen in a 24 hour period from all sources.    If you are taking extra strength Tylenol/acetaminophen (500 mg), the maximum dose is 6 tablets in 24 hours.    If you are taking regular strength acetaminophen (325 mg), the maximum dose is 9 tablets in 24 hours.    Call a doctor for any of the followin. Signs of infection (fever, growing tenderness at the surgery site, a large amount of drainage or bleeding, severe pain, foul-smelling drainage, redness, swelling).  2. It has been over 8 to 10 hours since surgery and you are still not able to urinate (pass water).  3. Headache for over 24 hours.  4. Signs of Covid-19 infection (temperature over 100 degrees, shortness of breath, cough, loss of taste/smell, generalized body aches, persistent headache, chills, sore throat, nausea/vomiting/diarrhea)    Your doctor is:   Dr. Ger Kessler, Ophthalmology: [phone]  After Hours and Weekends dial: [phone] and ask for the resident on call for:  Ophthalmology  For emergency care, call the:  [facility]:  [phone] (TTY for hearing impaired: [phone])

## 2021-03-17 NOTE — ANESTHESIA CARE TRANSFER NOTE
Patient: Lucrecia Thornton    Procedure(s):  Bilateral upper lid retraction repair    Diagnosis: Eyelid retraction, unspecified laterality [H02.539]  Thyroid eye disease [E05.00]  Diagnosis Additional Information: No value filed.    Anesthesia Type:   MAC     Note:      Level of Consciousness: awake  Oxygen Supplementation: room air    Independent Airway: airway patency satisfactory and stable  Dentition: dentition unchanged  Vital Signs Stable: post-procedure vital signs reviewed and stable  Report to RN Given: handoff report given  Patient transferred to: Phase II    Handoff Report: Identifed the Patient, Identified the Reponsible Provider, Reviewed the pertinent medical history, Discussed the surgical course, Reviewed Intra-OP anesthesia mangement and issues during anesthesia, Set expectations for post-procedure period and Allowed opportunity for questions and acknowledgement of understanding      Vitals: (Last set prior to Anesthesia Care Transfer)  CRNA VITALS  3/17/2021 0745 - 3/17/2021 0819      3/17/2021             Resp Rate (set):  10        Electronically Signed By: JOANN Alvarez CRNA  March 17, 2021  8:19 AM

## 2021-03-17 NOTE — ANESTHESIA POSTPROCEDURE EVALUATION
Patient: Lucrecia Thornton    Procedure(s):  Bilateral upper lid retraction repair    Diagnosis:Eyelid retraction, unspecified laterality [H02.539]  Thyroid eye disease [E05.00]  Diagnosis Additional Information: No value filed.    Anesthesia Type:  MAC    Note:  Disposition: Outpatient   Postop Pain Control: Uneventful            Sign Out: Well controlled pain   PONV:    Neuro/Psych: Uneventful            Sign Out: Acceptable/Baseline neuro status   Airway/Respiratory: Uneventful            Sign Out: Acceptable/Baseline resp. status   CV/Hemodynamics: Uneventful            Sign Out: Acceptable CV status   Other NRE:    DID A NON-ROUTINE EVENT OCCUR?          Last vitals:  Vitals:    03/17/21 0818 03/17/21 0830 03/17/21 0844   BP: 106/61 105/69 128/85   Pulse: 61 58 59   Resp: 16 16 16   Temp: 36.1  C (96.9  F)  36.4  C (97.6  F)   SpO2: 99% 99% 99%       Last vitals prior to Anesthesia Care Transfer:  CRNA VITALS  3/17/2021 0745 - 3/17/2021 0845      3/17/2021             Resp Rate (set):  10          Electronically Signed By: Mychal Yang MD  March 17, 2021  9:32 AM

## 2021-03-18 ENCOUNTER — TELEPHONE (OUTPATIENT)
Dept: OPHTHALMOLOGY | Facility: CLINIC | Age: 56
End: 2021-03-18

## 2021-03-19 NOTE — TELEPHONE ENCOUNTER
Telephone call to Lucrecia Thornton    Doing well with no pain, good vision, and no bleeding. All questions were answered, she is doing well, and postoperative care was reviewed.  A postop appointment has been scheduled.    Derik Ibrahim MD

## 2021-03-29 ENCOUNTER — OFFICE VISIT (OUTPATIENT)
Dept: OPHTHALMOLOGY | Facility: CLINIC | Age: 56
End: 2021-03-29
Payer: COMMERCIAL

## 2021-03-29 DIAGNOSIS — E07.9 THYROID EYE DISEASE: ICD-10-CM

## 2021-03-29 DIAGNOSIS — Z98.890 POSTOPERATIVE EYE STATE: Primary | ICD-10-CM

## 2021-03-29 DIAGNOSIS — H57.89 THYROID EYE DISEASE: ICD-10-CM

## 2021-03-29 PROBLEM — Z97.5 BREAKTHROUGH BLEEDING WITH IUD: Status: ACTIVE | Noted: 2018-03-14

## 2021-03-29 PROBLEM — N92.1 BREAKTHROUGH BLEEDING WITH IUD: Status: ACTIVE | Noted: 2018-03-14

## 2021-03-29 PROBLEM — Z80.3 FAMILY HISTORY OF BREAST CANCER: Status: ACTIVE | Noted: 2017-02-03

## 2021-03-29 PROBLEM — Z78.9 USES CONTRACEPTION: Status: ACTIVE | Noted: 2017-02-03

## 2021-03-29 PROCEDURE — 99024 POSTOP FOLLOW-UP VISIT: CPT | Mod: GC | Performed by: OPHTHALMOLOGY

## 2021-03-29 ASSESSMENT — TONOMETRY
IOP_METHOD: ICARE
OD_IOP_MMHG: 13
OS_IOP_MMHG: 13

## 2021-03-29 ASSESSMENT — VISUAL ACUITY
OS_SC: 20/20
OD_SC: 20/20
METHOD: SNELLEN - LINEAR
OD_SC+: -2

## 2021-03-29 ASSESSMENT — EXTERNAL EXAM - RIGHT EYE: OD_EXAM: NORMAL

## 2021-03-29 ASSESSMENT — EXTERNAL EXAM - LEFT EYE: OS_EXAM: NORMAL

## 2021-03-29 NOTE — PROGRESS NOTES
Chief Complaints and History of Present Illnesses   Patient presents with     Post Op (Ophthalmology) Both Eyes     Chief Complaint(s) and History of Present Illness(es)     Post Op (Ophthalmology) Both Eyes     In both eyes.  Associated symptoms include dryness (no changes ).    Negative for burning, itching and eye pain.  Pain was noted as 0/10.              Comments     Patient is using EES lissett at bedtime, no concerns today per pt  Krystal Wills ALISHA March 29, 2021 11:13 AM                  Assessment & Plan     Lucrecia Thornton is a 55 year old female with the following diagnoses:   1. Postoperative eye state    2. Thyroid eye disease       POW 1.5 s/p bilateral upper eyelid recession. Doing well. Mild dryness in both eyes continue lubricating regimen artificial tears during the day and refresh pm ointment at bedtime.   Continue to apply erythromycin ointment to incision sites until runs out switch to vaseline. Continue warm compresses.     Jackie Pereira MD  Ophthalmology Resident, PGY-2  [facility]           Attending Physician Attestation:  Complete documentation of historical and exam elements from today's encounter can be found in the full encounter summary report (not reduplicated in this progress note).  I personally obtained the chief complaint(s) and history of present illness.  I confirmed and edited as necessary the review of systems, past medical/surgical history, family history, social history, and examination findings as documented by others; and I examined the patient myself.  I personally reviewed the relevant tests, images, and reports as documented above.  I formulated and edited as necessary the assessment and plan and discussed the findings and management plan with the patient and family.   -Ger Kessler MD  11:23 AM 3/29/2021

## 2021-03-29 NOTE — NURSING NOTE
Chief Complaints and History of Present Illnesses   Patient presents with     Post Op (Ophthalmology) Both Eyes     Chief Complaint(s) and History of Present Illness(es)     Post Op (Ophthalmology) Both Eyes     Laterality: both eyes    Associated symptoms: dryness (no changes ).  Negative for burning, itching and eye pain    Pain scale: 0/10              Comments     Patient is using EES lissett at bedtime, no concerns today per pt  Krystal BRITO March 29, 2021 11:13 AM

## 2021-05-24 ENCOUNTER — OFFICE VISIT (OUTPATIENT)
Dept: OPHTHALMOLOGY | Facility: CLINIC | Age: 56
End: 2021-05-24
Payer: COMMERCIAL

## 2021-05-24 DIAGNOSIS — Z98.890 POSTOPERATIVE EYE STATE: Primary | ICD-10-CM

## 2021-05-24 DIAGNOSIS — H57.89 THYROID EYE DISEASE: ICD-10-CM

## 2021-05-24 DIAGNOSIS — E07.9 THYROID EYE DISEASE: ICD-10-CM

## 2021-05-24 PROCEDURE — 99024 POSTOP FOLLOW-UP VISIT: CPT | Performed by: OPHTHALMOLOGY

## 2021-05-24 ASSESSMENT — TONOMETRY
IOP_METHOD: ICARE
OS_IOP_MMHG: 13
OD_IOP_MMHG: 13

## 2021-05-24 ASSESSMENT — VISUAL ACUITY
METHOD: SNELLEN - LINEAR
OD_SC: 20/20
OS_SC: 20/20

## 2021-05-24 ASSESSMENT — MARGIN REFLEX DISTANCE
OS_MRD1: 4
OD_MRD1: 4

## 2021-05-24 ASSESSMENT — EXTERNAL EXAM - LEFT EYE: OS_EXAM: NORMAL

## 2021-05-24 ASSESSMENT — EXTERNAL EXAM - RIGHT EYE: OD_EXAM: NORMAL

## 2021-05-24 ASSESSMENT — SLIT LAMP EXAM - LIDS
COMMENTS: NORMAL
COMMENTS: NORMAL

## 2021-05-24 NOTE — NURSING NOTE
Chief Complaints and History of Present Illnesses   Patient presents with     Post Op (Ophthalmology) Both Eyes     Chief Complaint(s) and History of Present Illness(es)     Post Op (Ophthalmology) Both Eyes     Laterality: both eyes    Onset: years ago    Frequency: constantly    Course: stable    Associated symptoms: dryness    Treatments tried: artificial tears    Pain scale: 0/10              Comments     S/p Bilateral  upper eyelid recession on 3/17/2021. Pt states happy with eye positioning, still some numbness around both eyes. Eyes have felt really dry- waking up at night to before drops in. Refresh drops throughout the night.     ALISHA Quiroz COT 1:24 PM May 24, 2021

## 2021-05-24 NOTE — PROGRESS NOTES
Chief Complaints and History of Present Illnesses   Patient presents with     Post Op (Ophthalmology) Both Eyes     Chief Complaint(s) and History of Present Illness(es)     Post Op (Ophthalmology) Both Eyes     In both eyes.  This started years ago.  Occurring constantly.  Since   onset it is stable.  Associated symptoms include dryness.  Treatments   tried include artificial tears.  Pain was noted as 0/10.              Comments     S/p Bilateral  upper eyelid recession on 3/17/2021. Pt states happy with   eye positioning, still some numbness around both eyes. Eyes have felt   really dry- waking up at night to before drops in. Refresh drops   throughout the night.     Jarek Mosquera, ALISHA COT 1:24 PM May 24, 2021             Assessment & Plan     Lucrecia Thornton is a 55 year old female with the following diagnoses:   1. Postoperative eye state    2. Thyroid eye disease       -healed    PLAN:  Return to clinic 3 months for final check            Attending Physician Attestation:  Complete documentation of historical and exam elements from today's encounter can be found in the full encounter summary report (not reduplicated in this progress note).  I personally obtained the chief complaint(s) and history of present illness.  I confirmed and edited as necessary the review of systems, past medical/surgical history, family history, social history, and examination findings as documented by others; and I examined the patient myself.  I personally reviewed the relevant tests, images, and reports as documented above.  I formulated and edited as necessary the assessment and plan and discussed the findings and management plan with the patient and family. I personally reviewed the ophthalmic test(s) associated with this encounter, agree with the interpretation(s) as documented by the resident/fellow, and have edited the corresponding report(s) as necessary.   -Ger Kessler MD  1:40 PM 5/24/2021

## 2021-08-15 ENCOUNTER — HEALTH MAINTENANCE LETTER (OUTPATIENT)
Age: 56
End: 2021-08-15

## 2021-09-27 ENCOUNTER — OFFICE VISIT (OUTPATIENT)
Dept: OPHTHALMOLOGY | Facility: CLINIC | Age: 56
End: 2021-09-27
Payer: COMMERCIAL

## 2021-09-27 DIAGNOSIS — E07.9 THYROID EYE DISEASE: ICD-10-CM

## 2021-09-27 DIAGNOSIS — H57.89 THYROID EYE DISEASE: ICD-10-CM

## 2021-09-27 DIAGNOSIS — E05.00 GRAVES DISEASE: Primary | ICD-10-CM

## 2021-09-27 DIAGNOSIS — Z98.890 POSTOPERATIVE EYE STATE: ICD-10-CM

## 2021-09-27 PROCEDURE — 92285 EXTERNAL OCULAR PHOTOGRAPHY: CPT | Performed by: OPHTHALMOLOGY

## 2021-09-27 PROCEDURE — 99213 OFFICE O/P EST LOW 20 MIN: CPT | Performed by: OPHTHALMOLOGY

## 2021-09-27 ASSESSMENT — TONOMETRY
OD_IOP_MMHG: 9
OS_IOP_MMHG: 10
IOP_METHOD: ICARE

## 2021-09-27 ASSESSMENT — CONF VISUAL FIELD
METHOD: COUNTING FINGERS
OS_NORMAL: 1
OD_NORMAL: 1

## 2021-09-27 ASSESSMENT — MARGIN REFLEX DISTANCE
OD_MRD1: 4
OS_MRD1: 4

## 2021-09-27 ASSESSMENT — VISUAL ACUITY
OS_SC: 20/20
OD_SC+: -3
METHOD: SNELLEN - LINEAR
OS_SC+: -1
OD_SC: 20/20

## 2021-09-27 ASSESSMENT — EXTERNAL EXAM - LEFT EYE: OS_EXAM: BROW PTOSIS

## 2021-09-27 ASSESSMENT — EXTERNAL EXAM - RIGHT EYE: OD_EXAM: BROW PTOSIS

## 2021-09-27 NOTE — NURSING NOTE
Chief Complaints and History of Present Illnesses   Patient presents with     Follow Up      S/p Bilateral  upper eyelid recession on 3/17/2021     Chief Complaint(s) and History of Present Illness(es)     Follow Up     Laterality: both eyes    Associated symptoms: dryness.  Negative for discharge, eye pain and redness    Pain scale: 0/10    Comments:  S/p Bilateral  upper eyelid recession on 3/17/2021              Comments     The past few week have been good and feeling really good now. Still some swelling yet in the morning but then clears up. Had been having some more swelling the month prior but questions if this might have been related to allergy. Has been needing to use some nasal sprays and allergy medication then and still continues to use now.   No  Pain at all just feeling of tightness. Dryness with each eye and some double vision but this was prior to surgery and not new. Vision has remained stable each eye.   Lubricants PRN each eye.   ALISHA Quiroz COT 12:13 PM September 27, 2021

## 2021-09-27 NOTE — PROGRESS NOTES
Chief Complaints and History of Present Illnesses   Patient presents with     Follow Up      S/p Bilateral  upper eyelid recession on 3/17/2021     Chief Complaint(s) and History of Present Illness(es)     Follow Up     In both eyes.  Associated symptoms include dryness.  Negative for   discharge, eye pain and redness.  Pain was noted as 0/10. Additional   comments:  S/p Bilateral  upper eyelid recession on 3/17/2021              Comments     The past few week have been good and feeling really good now. Still some   swelling yet in the morning but then clears up. Had been having some more   swelling the month prior but questions if this might have been related to   allergy. Has been needing to use some nasal sprays and allergy medication   then and still continues to use now.   No  Pain at all just feeling of tightness. Dryness with each eye and some   double vision but this was prior to surgery and not new. Vision has   remained stable each eye.   Lubricants PRN each eye.   Swetha Dorado, COT COT 12:13 PM September 27, 2021               Assessment & Plan     Lucrecia Thornton is a 55 year old female with the following diagnoses:   1. Graves disease    2. Postoperative eye state    3. Thyroid eye disease       - doing well  - Thyroid eye disease stable    PLAN:  Return to clinic as needed               Attending Physician Attestation:  Complete documentation of historical and exam elements from today's encounter can be found in the full encounter summary report (not reduplicated in this progress note).  I personally obtained the chief complaint(s) and history of present illness.  I confirmed and edited as necessary the review of systems, past medical/surgical history, family history, social history, and examination findings as documented by others; and I examined the patient myself.  I personally reviewed the relevant tests, images, and reports as documented above.  I formulated and edited as necessary the  assessment and plan and discussed the findings and management plan with the patient and family.   -Ger Kessler MD  12:22 PM 9/27/2021

## 2022-01-30 ENCOUNTER — HEALTH MAINTENANCE LETTER (OUTPATIENT)
Age: 57
End: 2022-01-30

## 2022-09-24 ENCOUNTER — HEALTH MAINTENANCE LETTER (OUTPATIENT)
Age: 57
End: 2022-09-24

## 2023-05-08 ENCOUNTER — HEALTH MAINTENANCE LETTER (OUTPATIENT)
Age: 58
End: 2023-05-08

## 2024-04-01 PROBLEM — H02.539 EYELID RETRACTION: Status: ACTIVE | Noted: 2020-12-29

## 2024-07-14 ENCOUNTER — HEALTH MAINTENANCE LETTER (OUTPATIENT)
Age: 59
End: 2024-07-14

## (undated) DEVICE — ESU EYE HIGH TEMP 65410-183

## (undated) DEVICE — PACK MINOR EYE CUSTOM ASC

## (undated) DEVICE — EYE PREP BETADINE 5% SOLUTION 30ML 0065-0411-30

## (undated) DEVICE — SOL WATER IRRIG 500ML BOTTLE 2F7113

## (undated) DEVICE — GLOVE PROTEXIS MICRO 7.5  2D73PM75

## (undated) DEVICE — SU PLAIN 6-0 G-1DA 18" 770G

## (undated) DEVICE — LINEN TOWEL PACK X5 5464

## (undated) RX ORDER — DEXAMETHASONE SODIUM PHOSPHATE 4 MG/ML
INJECTION, SOLUTION INTRA-ARTICULAR; INTRALESIONAL; INTRAMUSCULAR; INTRAVENOUS; SOFT TISSUE
Status: DISPENSED
Start: 2021-03-17

## (undated) RX ORDER — TETRACAINE HYDROCHLORIDE 5 MG/ML
SOLUTION OPHTHALMIC
Status: DISPENSED
Start: 2021-03-17

## (undated) RX ORDER — ACETAMINOPHEN 325 MG/1
TABLET ORAL
Status: DISPENSED
Start: 2021-03-17

## (undated) RX ORDER — PROPOFOL 10 MG/ML
INJECTION, EMULSION INTRAVENOUS
Status: DISPENSED
Start: 2021-03-17

## (undated) RX ORDER — LIDOCAINE HYDROCHLORIDE 20 MG/ML
INJECTION, SOLUTION EPIDURAL; INFILTRATION; INTRACAUDAL; PERINEURAL
Status: DISPENSED
Start: 2021-03-17

## (undated) RX ORDER — ERYTHROMYCIN 5 MG/G
OINTMENT OPHTHALMIC
Status: DISPENSED
Start: 2021-03-17

## (undated) RX ORDER — GENTAMICIN 40 MG/ML
INJECTION, SOLUTION INTRAMUSCULAR; INTRAVENOUS
Status: DISPENSED
Start: 2021-03-17

## (undated) RX ORDER — LIDOCAINE HYDROCHLORIDE AND EPINEPHRINE 10; 10 MG/ML; UG/ML
INJECTION, SOLUTION INFILTRATION; PERINEURAL
Status: DISPENSED
Start: 2021-03-17

## (undated) RX ORDER — FENTANYL CITRATE 50 UG/ML
INJECTION, SOLUTION INTRAMUSCULAR; INTRAVENOUS
Status: DISPENSED
Start: 2021-03-17

## (undated) RX ORDER — OXYCODONE HYDROCHLORIDE 5 MG/1
TABLET ORAL
Status: DISPENSED
Start: 2021-03-17